# Patient Record
Sex: FEMALE | Race: BLACK OR AFRICAN AMERICAN | NOT HISPANIC OR LATINO | Employment: FULL TIME | URBAN - METROPOLITAN AREA
[De-identification: names, ages, dates, MRNs, and addresses within clinical notes are randomized per-mention and may not be internally consistent; named-entity substitution may affect disease eponyms.]

---

## 2020-10-19 DIAGNOSIS — Z76.89 ENCOUNTER TO ESTABLISH CARE WITH NEW DOCTOR: Primary | ICD-10-CM

## 2020-10-23 ENCOUNTER — TELEPHONE (OUTPATIENT)
Dept: OBSTETRICS AND GYNECOLOGY | Facility: CLINIC | Age: 55
End: 2020-10-23

## 2020-10-23 NOTE — TELEPHONE ENCOUNTER
----- Message from Tahira Sky sent at 10/23/2020  2:06 PM CDT -----  Regarding: pt  Type:  Patient Returning Call    Who Called:pt  Who Left Message for Patient:Shannan  Does the patient know what this is regarding?:  Would the patient rather a call back or a response via [facility]? Call back  Best Call Back Number:[phone]  Additional Information:

## 2020-11-18 ENCOUNTER — OFFICE VISIT (OUTPATIENT)
Dept: OBSTETRICS AND GYNECOLOGY | Facility: CLINIC | Age: 55
End: 2020-11-18
Payer: MEDICAID

## 2020-11-18 VITALS
SYSTOLIC BLOOD PRESSURE: 144 MMHG | WEIGHT: 235.19 LBS | BODY MASS INDEX: 32.93 KG/M2 | DIASTOLIC BLOOD PRESSURE: 81 MMHG | HEART RATE: 59 BPM | HEIGHT: 71 IN

## 2020-11-18 DIAGNOSIS — N93.8 DUB (DYSFUNCTIONAL UTERINE BLEEDING): ICD-10-CM

## 2020-11-18 DIAGNOSIS — N93.9 ABNORMAL VAGINAL BLEEDING: Primary | ICD-10-CM

## 2020-11-18 LAB
ABS NRBC COUNT: 0 X 10 3/UL (ref 0–0.01)
ABSOLUTE BASOPHIL: 0.02 X 10 3/UL (ref 0–0.22)
ABSOLUTE EOSINOPHIL: 0.23 X 10 3/UL (ref 0.04–0.54)
ABSOLUTE IMMATURE GRAN: 0.02 X 10 3/UL (ref 0–0.04)
ABSOLUTE LYMPHOCYTE: 2.31 X 10 3/UL (ref 0.86–4.75)
ABSOLUTE MONOCYTE: 0.61 X 10 3/UL (ref 0.22–1.08)
BASOPHILS NFR BLD: 0.3 % (ref 0.2–1.2)
EOSINOPHIL NFR BLD: 3.5 % (ref 0.7–7)
FSH: 26 MIU/ML
HCT VFR BLD AUTO: 31.1 % (ref 37–47)
HGB BLD-MCNC: 9 G/DL (ref 12–16)
IMMATURE GRANULOCYTES: 0.3 % (ref 0–0.5)
LH: 18 MIU/ML
LYMPHOCYTES NFR BLD: 34.8 % (ref 19.3–53.1)
MCH RBC QN AUTO: 21.4 PG (ref 27–32)
MCHC RBC AUTO-ENTMCNC: 28.9 G/DL (ref 32–36)
MCV RBC AUTO: 74 FL (ref 82–100)
MONOCYTES NFR BLD: 9.2 % (ref 4.7–12.5)
NEUTROPHILS # BLD AUTO: 3.44 X 10 3/UL (ref 2.15–7.56)
NEUTROPHILS NFR BLD: 51.9 %
NUCLEATED RED BLOOD CELLS: 0 /100 WBC (ref 0–0.2)
PLATELET # BLD AUTO: ABNORMAL X 10 3/UL (ref 135–400)
RBC # BLD AUTO: 4.2 X 10 6/UL (ref 4.2–5.4)
RDW-SD: 51.9 FL (ref 37–54)
WBC # BLD: 6.63 X 10 3/UL (ref 4.3–10.8)

## 2020-11-18 PROCEDURE — 99203 PR OFFICE/OUTPT VISIT, NEW, LEVL III, 30-44 MIN: ICD-10-PCS | Mod: S$GLB,,, | Performed by: OBSTETRICS & GYNECOLOGY

## 2020-11-18 PROCEDURE — 99203 OFFICE O/P NEW LOW 30 MIN: CPT | Mod: S$GLB,,, | Performed by: OBSTETRICS & GYNECOLOGY

## 2020-11-18 RX ORDER — IBUPROFEN 800 MG/1
TABLET ORAL
COMMUNITY
Start: 2020-10-01 | End: 2021-04-30 | Stop reason: ALTCHOICE

## 2020-11-18 NOTE — PROGRESS NOTES
Subjective:       Patient ID: Mayra Bellamy is a 55 y.o. female.    Chief Complaint: Vaginal Bleeding    Is a 55-year-old female here for heavy bleeding she had no periods for about 4 months within the past year than last several months is had little bit of bleeding often on this started very heavy bleeding last 3 days.  She was take sounds in stop her bleeding but does not wanting an white therefore a total body bone give her Depo-Provera.  Will give her Provera 10 mg 1 every 6 hr for 3 doses then 1 a day will get FSH S CBC will schedule sonogram week she has had at fibroids and will go from there    Review of Systems   Constitutional: Negative for activity change, appetite change, chills, fever and unexpected weight change.   HENT: Negative for nasal congestion, dental problem, facial swelling, hearing loss and mouth sores.    Respiratory: Negative for apnea, chest tightness, shortness of breath and wheezing.    Cardiovascular: Negative for chest pain and leg swelling.   Gastrointestinal: Negative for abdominal distention, abdominal pain, anal bleeding, blood in stool, constipation, diarrhea, nausea, rectal pain and vomiting.   Genitourinary: Negative for decreased urine volume, difficulty urinating, dyspareunia, dysuria, enuresis, flank pain, frequency, genital sores, hematuria, menstrual problem, pelvic pain, urgency, vaginal bleeding, vaginal discharge and vaginal pain.   Musculoskeletal: Negative for arthralgias, back pain, joint swelling, myalgias and neck pain.   Integumentary:  Negative for color change, pallor, rash and wound.   Allergic/Immunologic: Negative for immunocompromised state.   Neurological: Negative for dizziness, light-headedness and headaches.   Hematological: Negative for adenopathy. Does not bruise/bleed easily.   Psychiatric/Behavioral: Negative for agitation, behavioral problems, confusion, sleep disturbance and suicidal ideas. The patient is not nervous/anxious and is not hyperactive.           Objective:      Physical Exam  Constitutional:       Appearance: She is well-developed.   HENT:      Head: Normocephalic.      Nose: Nose normal.   Eyes:      Conjunctiva/sclera: Conjunctivae normal.      Pupils: Pupils are equal, round, and reactive to light.   Neck:      Musculoskeletal: Normal range of motion and neck supple.   Cardiovascular:      Rate and Rhythm: Normal rate and regular rhythm.      Heart sounds: Normal heart sounds.   Pulmonary:      Effort: Pulmonary effort is normal.      Breath sounds: Normal breath sounds.   Abdominal:      General: Bowel sounds are normal.      Palpations: Abdomen is soft.   Genitourinary:     Vagina: Normal.      Comments: Uterus is a high feel enlargement her fibroids no adnexal masses is a mild moderate amount of old blood in the vaginal vault  Musculoskeletal: Normal range of motion.   Skin:     General: Skin is warm and dry.   Neurological:      Mental Status: She is alert and oriented to person, place, and time.   Psychiatric:         Behavior: Behavior normal.         Thought Content: Thought content normal.         Assessment:       1. Abnormal vaginal bleeding    2. DUB (dysfunctional uterine bleeding)        Plan:         Provera 10 mg 1 every 6 hr 3 doses then 1 day

## 2020-11-18 NOTE — LETTER
November 18, 2020      Sukhwinder Haney MD  [facility]  [address]  [facility] [address]           [facility]  [address]  [place] [address]  Phone: [phone]  Fax: [phone]          Patient: Mayra Bellamy   MR Number: [id number]   YOB: 1965   Date of Visit: 11/18/2020       Dear Dr. Sukhwinder Haney:    Thank you for referring Mayra Bellamy to me for evaluation. Attached you will find relevant portions of my assessment and plan of care.    If you have questions, please do not hesitate to call me. I look forward to following Mayra Bellamy along with you.    Sincerely,    Demetrius Cortez MD    Enclosure  CC:  No Recipients    If you would like to receive this communication electronically, please contact [email or web address] or [phone] to request more information on [facility] access.    For providers and/or their staff who would like to refer a patient to [facility], please contact us through our one-stop-shop provider referral line, [facility], at [phone].    If you feel you have received this communication in error or would no longer like to receive these types of communications, please e-mail [email or web address]

## 2020-11-19 ENCOUNTER — TELEPHONE (OUTPATIENT)
Dept: OBSTETRICS AND GYNECOLOGY | Facility: CLINIC | Age: 55
End: 2020-11-19

## 2020-11-19 NOTE — TELEPHONE ENCOUNTER
----- Message from Deyanira Gerardo sent at 11/19/2020  2:01 PM CST -----  Please call pt @ [phone] regarding medication for bleeding, pt states call several times, pt states it urgent, states pharmacy do not, pt need to know status.

## 2020-11-25 ENCOUNTER — TELEPHONE (OUTPATIENT)
Dept: OBSTETRICS AND GYNECOLOGY | Facility: CLINIC | Age: 55
End: 2020-11-25

## 2020-11-25 ENCOUNTER — PROCEDURE VISIT (OUTPATIENT)
Dept: OBSTETRICS AND GYNECOLOGY | Facility: CLINIC | Age: 55
End: 2020-11-25
Payer: MEDICAID

## 2020-11-25 ENCOUNTER — OFFICE VISIT (OUTPATIENT)
Dept: OBSTETRICS AND GYNECOLOGY | Facility: CLINIC | Age: 55
End: 2020-11-25
Payer: MEDICAID

## 2020-11-25 DIAGNOSIS — N93.8 DUB (DYSFUNCTIONAL UTERINE BLEEDING): Primary | ICD-10-CM

## 2020-11-25 DIAGNOSIS — N93.8 DUB (DYSFUNCTIONAL UTERINE BLEEDING): ICD-10-CM

## 2020-11-25 PROCEDURE — 99213 PR OFFICE/OUTPT VISIT, EST, LEVL III, 20-29 MIN: ICD-10-PCS | Mod: 25,S$GLB,, | Performed by: OBSTETRICS & GYNECOLOGY

## 2020-11-25 PROCEDURE — 76830 TRANSVAGINAL US NON-OB: CPT | Mod: 26,S$GLB,, | Performed by: OBSTETRICS & GYNECOLOGY

## 2020-11-25 PROCEDURE — 58100 BIOPSY OF UTERUS LINING: CPT | Mod: S$GLB,,, | Performed by: OBSTETRICS & GYNECOLOGY

## 2020-11-25 PROCEDURE — 58100 PR BIOPSY OF UTERUS LINING: ICD-10-PCS | Mod: S$GLB,,, | Performed by: OBSTETRICS & GYNECOLOGY

## 2020-11-25 PROCEDURE — 76830 PR  ECHOGRAPHY,TRANSVAGINAL: ICD-10-PCS | Mod: 26,S$GLB,, | Performed by: OBSTETRICS & GYNECOLOGY

## 2020-11-25 PROCEDURE — 99213 OFFICE O/P EST LOW 20 MIN: CPT | Mod: 25,S$GLB,, | Performed by: OBSTETRICS & GYNECOLOGY

## 2020-11-25 NOTE — PROGRESS NOTES
This patient is a 55-year-old female had a very heavy bleed ultrasound shows 2 small fibroids 27 x 24 and 29 x 27 endometrium is 11.5 right after this having bleed see viewpoint for complete dictation endometrial biopsy was done today Betadine prep of the cervix was done uterus is very high as she has had a section before and a biopsy was done with old black blood retrieved she has had a little bit of cramping did well no vasovagal insert was removed from the vagina she is him some was sent home the same difficulty will await the biopsy results and will decide what to do,, see viewpoint for complete dictation on ultrasound

## 2020-12-01 RX ORDER — NORETHINDRONE ACETATE AND ETHINYL ESTRADIOL 1; 5 MG/1; UG/1
1 TABLET ORAL DAILY
Qty: 30 TABLET | Refills: 11 | Status: SHIPPED | OUTPATIENT
Start: 2020-12-01 | End: 2022-01-19

## 2020-12-23 NOTE — PROGRESS NOTES
Patient is here for dysfunctional bleeding ultrasound shows a uterine fibroid and left ovarian cyst see ultrasound report for complete details

## 2021-01-19 DIAGNOSIS — N93.9 ABNORMAL VAGINAL BLEEDING: Primary | ICD-10-CM

## 2021-01-19 RX ORDER — FERROUS SULFATE 325(65) MG
TABLET ORAL
Qty: 30 TABLET | Refills: 0 | Status: SHIPPED | OUTPATIENT
Start: 2021-01-19 | End: 2021-03-22 | Stop reason: SDUPTHER

## 2021-02-24 ENCOUNTER — TELEPHONE (OUTPATIENT)
Dept: OBSTETRICS AND GYNECOLOGY | Facility: CLINIC | Age: 56
End: 2021-02-24

## 2021-03-22 ENCOUNTER — TELEPHONE (OUTPATIENT)
Dept: PRIMARY CARE CLINIC | Facility: CLINIC | Age: 56
End: 2021-03-22

## 2021-03-22 ENCOUNTER — OFFICE VISIT (OUTPATIENT)
Dept: PRIMARY CARE CLINIC | Facility: CLINIC | Age: 56
End: 2021-03-22
Payer: MEDICAID

## 2021-03-22 VITALS
BODY MASS INDEX: 34.25 KG/M2 | HEART RATE: 85 BPM | SYSTOLIC BLOOD PRESSURE: 129 MMHG | HEIGHT: 71 IN | WEIGHT: 244.63 LBS | TEMPERATURE: 98 F | DIASTOLIC BLOOD PRESSURE: 86 MMHG | OXYGEN SATURATION: 98 %

## 2021-03-22 DIAGNOSIS — K42.9 UMBILICAL HERNIA WITHOUT OBSTRUCTION AND WITHOUT GANGRENE: ICD-10-CM

## 2021-03-22 DIAGNOSIS — N93.9 ABNORMAL VAGINAL BLEEDING: ICD-10-CM

## 2021-03-22 DIAGNOSIS — F17.200 SMOKER: ICD-10-CM

## 2021-03-22 DIAGNOSIS — Z98.890 H/O ARTHROSCOPY OF LEFT KNEE: ICD-10-CM

## 2021-03-22 DIAGNOSIS — D50.9 IRON DEFICIENCY ANEMIA, UNSPECIFIED IRON DEFICIENCY ANEMIA TYPE: Primary | ICD-10-CM

## 2021-03-22 PROCEDURE — 99204 OFFICE O/P NEW MOD 45 MIN: CPT | Mod: S$GLB,,, | Performed by: INTERNAL MEDICINE

## 2021-03-22 PROCEDURE — 99204 PR OFFICE/OUTPT VISIT, NEW, LEVL IV, 45-59 MIN: ICD-10-PCS | Mod: S$GLB,,, | Performed by: INTERNAL MEDICINE

## 2021-03-22 RX ORDER — VARENICLINE TARTRATE 0.5 (11)-1
KIT ORAL
Qty: 1 PACKAGE | Refills: 0 | Status: SHIPPED | OUTPATIENT
Start: 2021-03-22 | End: 2022-01-19

## 2021-03-22 RX ORDER — MONTELUKAST SODIUM 10 MG/1
10 TABLET ORAL NIGHTLY
COMMUNITY
End: 2021-09-22 | Stop reason: SDUPTHER

## 2021-03-22 RX ORDER — ALBUTEROL SULFATE 90 UG/1
AEROSOL, METERED RESPIRATORY (INHALATION)
COMMUNITY
Start: 2020-12-18 | End: 2022-03-09 | Stop reason: SDUPTHER

## 2021-03-22 RX ORDER — MEDROXYPROGESTERONE ACETATE 5 MG/1
5 TABLET ORAL DAILY
COMMUNITY
Start: 2020-12-28 | End: 2023-05-04 | Stop reason: ALTCHOICE

## 2021-03-22 RX ORDER — FERROUS SULFATE 325(65) MG
325 TABLET ORAL DAILY
Qty: 90 TABLET | Refills: 3 | Status: SHIPPED | OUTPATIENT
Start: 2021-03-22 | End: 2021-04-08 | Stop reason: SDUPTHER

## 2021-03-23 ENCOUNTER — TELEPHONE (OUTPATIENT)
Dept: PRIMARY CARE CLINIC | Facility: CLINIC | Age: 56
End: 2021-03-23

## 2021-03-23 LAB
FERRITIN SERPL-MCNC: 39 NG/ML (ref 16–232)
IRON SATN MFR SERPL: 9 % (CALC) (ref 16–45)
IRON SERPL-MCNC: 36 MCG/DL (ref 45–160)
T4 FREE SERPL-MCNC: 1.1 NG/DL (ref 0.8–1.8)
TIBC SERPL-MCNC: 402 MCG/DL (CALC) (ref 250–450)
TSH SERPL-ACNC: 0.63 MIU/L

## 2021-03-30 ENCOUNTER — OFFICE VISIT (OUTPATIENT)
Dept: SURGERY | Facility: CLINIC | Age: 56
End: 2021-03-30
Payer: MEDICAID

## 2021-03-30 VITALS
DIASTOLIC BLOOD PRESSURE: 86 MMHG | BODY MASS INDEX: 33.46 KG/M2 | WEIGHT: 239 LBS | HEIGHT: 71 IN | SYSTOLIC BLOOD PRESSURE: 150 MMHG | HEART RATE: 87 BPM | OXYGEN SATURATION: 99 % | RESPIRATION RATE: 17 BRPM

## 2021-03-30 DIAGNOSIS — K43.6 INCARCERATED VENTRAL HERNIA: ICD-10-CM

## 2021-03-30 PROCEDURE — 99204 PR OFFICE/OUTPT VISIT, NEW, LEVL IV, 45-59 MIN: ICD-10-PCS | Mod: S$GLB,,, | Performed by: SURGERY

## 2021-03-30 PROCEDURE — 99204 OFFICE O/P NEW MOD 45 MIN: CPT | Mod: S$GLB,,, | Performed by: SURGERY

## 2021-04-08 DIAGNOSIS — N93.9 ABNORMAL VAGINAL BLEEDING: ICD-10-CM

## 2021-04-08 RX ORDER — FERROUS SULFATE 325(65) MG
325 TABLET ORAL DAILY
Qty: 90 TABLET | Refills: 3 | Status: SHIPPED | OUTPATIENT
Start: 2021-04-08 | End: 2021-11-11 | Stop reason: SDUPTHER

## 2021-04-14 LAB
ABS NRBC COUNT: 0 THOU/UL (ref 0–0.01)
ABSOLUTE BASOPHIL: 0 10*3/UL (ref 0–0.3)
ABSOLUTE EOSINOPHIL: 0.2 10*3/UL (ref 0–0.6)
ABSOLUTE IMMATURE GRAN: 0.02 THOU/UL (ref 0–0.03)
ABSOLUTE LYMPHOCYTE: 2.1 10*3/UL (ref 1.2–4)
ABSOLUTE MONOCYTE: 0.9 10*3/UL (ref 0.1–0.8)
ANION GAP SERPL CALC-SCNC: 6 MMOL/L (ref 3–11)
APTT PPP: 37.7 SEC (ref 25.1–36.5)
BASOPHILS NFR BLD: 0.1 % (ref 0–3)
BUN SERPL-MCNC: 9 MG/DL (ref 7–18)
CALCIUM SERPL-MCNC: 9.1 MG/DL (ref 8.5–10.1)
CHLORIDE SERPL-SCNC: 109 MMOL/L (ref 98–107)
CO2 SERPL-SCNC: 26 MMOL/L (ref 21–32)
CREAT SERPL-MCNC: 0.79 MG/DL (ref 0.55–1.02)
EOSINOPHIL NFR BLD: 2.3 % (ref 0–6)
ERYTHROCYTE [DISTWIDTH] IN BLOOD BY AUTOMATED COUNT: 16.2 % (ref 0–15.5)
GFR ESTIMATION: > 60
GLUCOSE SERPL-MCNC: 76 MG/DL (ref 74–106)
HCT VFR BLD AUTO: 40.6 % (ref 37–47)
HGB BLD-MCNC: 12.8 G/DL (ref 12–16)
IMMATURE GRANULOCYTES: 0.3 % (ref 0–0.43)
INR PPP: 1.2 INR (ref 0.9–1.1)
LYMPHOCYTES NFR BLD: 26.2 % (ref 20–45)
MCH RBC QN AUTO: 29.3 PG (ref 27–32)
MCHC RBC AUTO-ENTMCNC: 31.5 % (ref 32–36)
MCV RBC AUTO: 92.9 FL (ref 80–99)
MONOCYTES NFR BLD: 11.7 % (ref 2–10)
NEUTROPHILS # BLD AUTO: 4.8 10*3/UL (ref 1.4–7)
NEUTROPHILS NFR BLD: 59.4 % (ref 50–80)
NUCLEATED RED BLOOD CELLS: 0 % (ref 0–0.2)
PLATELETS: 305 10*3/UL (ref 130–400)
PMV BLD AUTO: 10.3 FL (ref 9.2–12.2)
POTASSIUM SERPL-SCNC: 4.4 MMOL/L (ref 3.5–5.1)
PROTHROMBIN TIME: 13.6 SEC (ref 10.2–12.9)
RBC # BLD AUTO: 4.37 10*6/UL (ref 4.2–5.4)
SODIUM BLD-SCNC: 141 MMOL/L (ref 131–143)
WBC # BLD: 8 10*3/UL (ref 4.5–10)

## 2021-04-21 ENCOUNTER — OUTSIDE PLACE OF SERVICE (OUTPATIENT)
Dept: SURGERY | Facility: CLINIC | Age: 56
End: 2021-04-21
Payer: MEDICAID

## 2021-04-21 ENCOUNTER — OUTSIDE PLACE OF SERVICE (OUTPATIENT)
Dept: SURGERY | Facility: CLINIC | Age: 56
End: 2021-04-21

## 2021-04-21 PROCEDURE — 49653 PR LAP VENTRAL/UMBILICAL HERNIA; INCARCERATED OR STRANGULATED: CPT | Mod: AS,,, | Performed by: REGISTERED NURSE

## 2021-04-21 PROCEDURE — 49653 PR LAP VENTRAL/UMBILICAL HERNIA; INCARCERATED OR STRANGULATED: ICD-10-PCS | Mod: AS,,, | Performed by: REGISTERED NURSE

## 2021-04-21 PROCEDURE — 49653 PR LAP VENTRAL/UMBILICAL HERNIA; INCARCERATED OR STRANGULATED: CPT | Mod: ,,, | Performed by: SURGERY

## 2021-04-21 PROCEDURE — 49653 PR LAP VENTRAL/UMBILICAL HERNIA; INCARCERATED OR STRANGULATED: ICD-10-PCS | Mod: ,,, | Performed by: SURGERY

## 2021-04-30 ENCOUNTER — TELEPHONE (OUTPATIENT)
Dept: SURGERY | Facility: CLINIC | Age: 56
End: 2021-04-30

## 2021-04-30 RX ORDER — IBUPROFEN 800 MG/1
800 TABLET ORAL 3 TIMES DAILY
Qty: 30 TABLET | Refills: 0 | Status: SHIPPED | OUTPATIENT
Start: 2021-04-30 | End: 2022-03-09 | Stop reason: SDUPTHER

## 2021-05-03 ENCOUNTER — OFFICE VISIT (OUTPATIENT)
Dept: SURGERY | Facility: CLINIC | Age: 56
End: 2021-05-03
Payer: MEDICAID

## 2021-05-03 VITALS
WEIGHT: 245 LBS | OXYGEN SATURATION: 95 % | SYSTOLIC BLOOD PRESSURE: 131 MMHG | DIASTOLIC BLOOD PRESSURE: 85 MMHG | BODY MASS INDEX: 34.3 KG/M2 | RESPIRATION RATE: 17 BRPM | HEART RATE: 74 BPM | HEIGHT: 71 IN

## 2021-05-03 DIAGNOSIS — Z98.890 STATUS POST SURGERY: Primary | ICD-10-CM

## 2021-05-03 PROCEDURE — 99024 POSTOP FOLLOW-UP VISIT: CPT | Mod: S$GLB,,, | Performed by: REGISTERED NURSE

## 2021-05-03 PROCEDURE — 99024 PR POST-OP FOLLOW-UP VISIT: ICD-10-PCS | Mod: S$GLB,,, | Performed by: REGISTERED NURSE

## 2021-05-03 RX ORDER — FERROUS SULFATE 325(65) MG
325 TABLET ORAL 2 TIMES DAILY
COMMUNITY
Start: 2021-04-10 | End: 2021-09-13 | Stop reason: SDUPTHER

## 2021-05-03 RX ORDER — TRAMADOL HYDROCHLORIDE 50 MG/1
50 TABLET ORAL EVERY 4 HOURS
COMMUNITY
Start: 2021-04-22 | End: 2023-05-04 | Stop reason: ALTCHOICE

## 2021-05-11 ENCOUNTER — TELEPHONE (OUTPATIENT)
Dept: ORTHOPEDICS | Facility: CLINIC | Age: 56
End: 2021-05-11

## 2021-05-11 ENCOUNTER — TELEPHONE (OUTPATIENT)
Dept: PRIMARY CARE CLINIC | Facility: CLINIC | Age: 56
End: 2021-05-11

## 2021-05-12 ENCOUNTER — TELEPHONE (OUTPATIENT)
Dept: ORTHOPEDICS | Facility: CLINIC | Age: 56
End: 2021-05-12

## 2021-05-13 ENCOUNTER — TELEPHONE (OUTPATIENT)
Dept: PRIMARY CARE CLINIC | Facility: CLINIC | Age: 56
End: 2021-05-13

## 2021-05-20 ENCOUNTER — OFFICE VISIT (OUTPATIENT)
Dept: PRIMARY CARE CLINIC | Facility: CLINIC | Age: 56
End: 2021-05-20
Payer: MEDICAID

## 2021-05-20 VITALS
DIASTOLIC BLOOD PRESSURE: 84 MMHG | BODY MASS INDEX: 34.91 KG/M2 | OXYGEN SATURATION: 95 % | HEIGHT: 71 IN | WEIGHT: 249.38 LBS | SYSTOLIC BLOOD PRESSURE: 134 MMHG | TEMPERATURE: 98 F | HEART RATE: 75 BPM

## 2021-05-20 DIAGNOSIS — G89.29 CHRONIC PAIN OF BOTH KNEES: Primary | ICD-10-CM

## 2021-05-20 DIAGNOSIS — M25.562 CHRONIC PAIN OF BOTH KNEES: Primary | ICD-10-CM

## 2021-05-20 DIAGNOSIS — M25.561 CHRONIC PAIN OF BOTH KNEES: Primary | ICD-10-CM

## 2021-05-20 PROCEDURE — 99214 OFFICE O/P EST MOD 30 MIN: CPT | Mod: S$GLB,,, | Performed by: INTERNAL MEDICINE

## 2021-05-20 PROCEDURE — 99214 PR OFFICE/OUTPT VISIT, EST, LEVL IV, 30-39 MIN: ICD-10-PCS | Mod: S$GLB,,, | Performed by: INTERNAL MEDICINE

## 2021-06-03 ENCOUNTER — TELEPHONE (OUTPATIENT)
Dept: SURGERY | Facility: CLINIC | Age: 56
End: 2021-06-03

## 2021-06-04 ENCOUNTER — TELEPHONE (OUTPATIENT)
Dept: PRIMARY CARE CLINIC | Facility: CLINIC | Age: 56
End: 2021-06-04

## 2021-06-15 ENCOUNTER — OFFICE VISIT (OUTPATIENT)
Dept: ORTHOPEDICS | Facility: CLINIC | Age: 56
End: 2021-06-15
Payer: MEDICAID

## 2021-06-15 VITALS — HEIGHT: 69 IN | BODY MASS INDEX: 37.5 KG/M2 | TEMPERATURE: 100 F | WEIGHT: 253.19 LBS

## 2021-06-15 DIAGNOSIS — M17.0 PRIMARY OSTEOARTHRITIS OF BOTH KNEES: ICD-10-CM

## 2021-06-15 PROCEDURE — 99203 PR OFFICE/OUTPT VISIT, NEW, LEVL III, 30-44 MIN: ICD-10-PCS | Mod: S$GLB,,, | Performed by: ORTHOPAEDIC SURGERY

## 2021-06-15 PROCEDURE — 99203 OFFICE O/P NEW LOW 30 MIN: CPT | Mod: S$GLB,,, | Performed by: ORTHOPAEDIC SURGERY

## 2021-06-29 ENCOUNTER — OFFICE VISIT (OUTPATIENT)
Dept: ORTHOPEDICS | Facility: CLINIC | Age: 56
End: 2021-06-29
Payer: MEDICAID

## 2021-06-29 DIAGNOSIS — M17.0 PRIMARY OSTEOARTHRITIS OF BOTH KNEES: Primary | ICD-10-CM

## 2021-06-29 PROCEDURE — 1160F RVW MEDS BY RX/DR IN RCRD: CPT | Mod: CPTII,S$GLB,, | Performed by: ORTHOPAEDIC SURGERY

## 2021-06-29 PROCEDURE — 1159F PR MEDICATION LIST DOCUMENTED IN MEDICAL RECORD: ICD-10-PCS | Mod: CPTII,S$GLB,, | Performed by: ORTHOPAEDIC SURGERY

## 2021-06-29 PROCEDURE — 1159F MED LIST DOCD IN RCRD: CPT | Mod: CPTII,S$GLB,, | Performed by: ORTHOPAEDIC SURGERY

## 2021-06-29 PROCEDURE — 99212 OFFICE O/P EST SF 10 MIN: CPT | Mod: S$GLB,,, | Performed by: ORTHOPAEDIC SURGERY

## 2021-06-29 PROCEDURE — 1160F PR REVIEW ALL MEDS BY PRESCRIBER/CLIN PHARMACIST DOCUMENTED: ICD-10-PCS | Mod: CPTII,S$GLB,, | Performed by: ORTHOPAEDIC SURGERY

## 2021-06-29 PROCEDURE — 99212 PR OFFICE/OUTPT VISIT, EST, LEVL II, 10-19 MIN: ICD-10-PCS | Mod: S$GLB,,, | Performed by: ORTHOPAEDIC SURGERY

## 2021-06-29 RX ORDER — MELOXICAM 15 MG/1
15 TABLET ORAL DAILY
COMMUNITY
Start: 2021-06-15 | End: 2023-05-04

## 2021-06-29 NOTE — LETTER
July 13, 2021      Maryjo Bolton MD  [address]  [place] [address]           [facility]  [address]  [place] [address]  Phone: [phone]  Fax: [phone]          Patient: Mayra Bellamy   MR Number: [id number]   YOB: 1965   Date of Visit: 6/29/2021       Dear Dr. Maryjo Bolton:    Thank you for referring Mayra Bellamy to me for evaluation. Attached you will find relevant portions of my assessment and plan of care.    If you have questions, please do not hesitate to call me. I look forward to following Mayra Bellamy along with you.    Sincerely,    Timothy Andino MD    Enclosure  CC:  No Recipients    If you would like to receive this communication electronically, please contact [email or web address] or [phone] to request more information on [facility] access.    For providers and/or their staff who would like to refer a patient to [facility], please contact us through our one-stop-shop provider referral line, [facility] , at [phone].    If you feel you have received this communication in error or would no longer like to receive these types of communications, please e-mail [email or web address]

## 2021-07-01 ENCOUNTER — TELEPHONE (OUTPATIENT)
Dept: PRIMARY CARE CLINIC | Facility: CLINIC | Age: 56
End: 2021-07-01

## 2021-07-12 ENCOUNTER — OFFICE VISIT (OUTPATIENT)
Dept: SURGERY | Facility: CLINIC | Age: 56
End: 2021-07-12
Payer: MEDICAID

## 2021-07-12 VITALS — WEIGHT: 251 LBS | BODY MASS INDEX: 37.18 KG/M2 | HEIGHT: 69 IN | RESPIRATION RATE: 18 BRPM

## 2021-07-12 DIAGNOSIS — K43.6 INCARCERATED VENTRAL HERNIA: Primary | ICD-10-CM

## 2021-07-12 PROCEDURE — 99024 POSTOP FOLLOW-UP VISIT: CPT | Mod: S$GLB,,, | Performed by: SURGERY

## 2021-07-12 PROCEDURE — 99024 PR POST-OP FOLLOW-UP VISIT: ICD-10-PCS | Mod: S$GLB,,, | Performed by: SURGERY

## 2021-08-26 ENCOUNTER — OFFICE VISIT (OUTPATIENT)
Dept: ORTHOPEDICS | Facility: CLINIC | Age: 56
End: 2021-08-26
Payer: MEDICAID

## 2021-08-26 DIAGNOSIS — M17.0 PRIMARY OSTEOARTHRITIS OF BOTH KNEES: Primary | ICD-10-CM

## 2021-08-26 PROCEDURE — 20610 LARGE JOINT ASPIRATION/INJECTION: BILATERAL KNEE: ICD-10-PCS | Mod: 50,S$GLB,, | Performed by: ORTHOPAEDIC SURGERY

## 2021-08-26 PROCEDURE — 99213 OFFICE O/P EST LOW 20 MIN: CPT | Mod: 25,S$GLB,, | Performed by: ORTHOPAEDIC SURGERY

## 2021-08-26 PROCEDURE — 20610 DRAIN/INJ JOINT/BURSA W/O US: CPT | Mod: 50,S$GLB,, | Performed by: ORTHOPAEDIC SURGERY

## 2021-08-26 PROCEDURE — 99213 PR OFFICE/OUTPT VISIT, EST, LEVL III, 20-29 MIN: ICD-10-PCS | Mod: 25,S$GLB,, | Performed by: ORTHOPAEDIC SURGERY

## 2021-08-26 RX ORDER — TRIAMCINOLONE ACETONIDE 40 MG/ML
20 INJECTION, SUSPENSION INTRA-ARTICULAR; INTRAMUSCULAR
Status: DISCONTINUED | OUTPATIENT
Start: 2021-08-26 | End: 2021-08-26 | Stop reason: HOSPADM

## 2021-08-26 RX ADMIN — TRIAMCINOLONE ACETONIDE 20 MG: 40 INJECTION, SUSPENSION INTRA-ARTICULAR; INTRAMUSCULAR at 09:08

## 2021-09-13 DIAGNOSIS — N93.9 ABNORMAL VAGINAL BLEEDING: ICD-10-CM

## 2021-09-13 DIAGNOSIS — D50.9 IRON DEFICIENCY ANEMIA, UNSPECIFIED IRON DEFICIENCY ANEMIA TYPE: Primary | ICD-10-CM

## 2021-09-13 RX ORDER — FERROUS SULFATE 325(65) MG
325 TABLET ORAL 2 TIMES DAILY
Qty: 90 TABLET | Refills: 0 | Status: SHIPPED | OUTPATIENT
Start: 2021-09-13 | End: 2021-10-15 | Stop reason: SDUPTHER

## 2021-09-13 RX ORDER — FERROUS SULFATE 325(65) MG
325 TABLET ORAL DAILY
Qty: 90 TABLET | Refills: 3 | OUTPATIENT
Start: 2021-09-13

## 2021-09-22 ENCOUNTER — TELEPHONE (OUTPATIENT)
Dept: OBSTETRICS AND GYNECOLOGY | Facility: CLINIC | Age: 56
End: 2021-09-22

## 2021-09-22 RX ORDER — MONTELUKAST SODIUM 10 MG/1
10 TABLET ORAL NIGHTLY
Qty: 30 TABLET | Refills: 0 | Status: SHIPPED | OUTPATIENT
Start: 2021-09-22 | End: 2021-10-15 | Stop reason: SDUPTHER

## 2021-10-15 DIAGNOSIS — D50.9 IRON DEFICIENCY ANEMIA, UNSPECIFIED IRON DEFICIENCY ANEMIA TYPE: ICD-10-CM

## 2021-10-17 RX ORDER — FERROUS SULFATE 325(65) MG
325 TABLET ORAL 2 TIMES DAILY
Qty: 90 TABLET | Refills: 0 | Status: SHIPPED | OUTPATIENT
Start: 2021-10-17 | End: 2022-03-09 | Stop reason: SDUPTHER

## 2021-10-17 RX ORDER — MONTELUKAST SODIUM 10 MG/1
10 TABLET ORAL NIGHTLY
Qty: 30 TABLET | Refills: 0 | Status: SHIPPED | OUTPATIENT
Start: 2021-10-17 | End: 2021-11-22

## 2021-10-20 ENCOUNTER — TELEPHONE (OUTPATIENT)
Dept: FAMILY MEDICINE | Facility: CLINIC | Age: 56
End: 2021-10-20

## 2021-10-27 ENCOUNTER — TELEPHONE (OUTPATIENT)
Dept: PRIMARY CARE CLINIC | Facility: CLINIC | Age: 56
End: 2021-10-27
Payer: MEDICAID

## 2021-10-27 DIAGNOSIS — J32.9 SINUSITIS, UNSPECIFIED CHRONICITY, UNSPECIFIED LOCATION: Primary | ICD-10-CM

## 2021-10-28 DIAGNOSIS — D50.9 IRON DEFICIENCY ANEMIA, UNSPECIFIED IRON DEFICIENCY ANEMIA TYPE: ICD-10-CM

## 2021-10-28 RX ORDER — AZITHROMYCIN 250 MG/1
TABLET, FILM COATED ORAL
Qty: 6 TABLET | Refills: 0 | Status: SHIPPED | OUTPATIENT
Start: 2021-10-28 | End: 2021-11-02

## 2021-10-28 RX ORDER — FERROUS SULFATE 325(65) MG
325 TABLET ORAL 2 TIMES DAILY
Qty: 90 TABLET | Refills: 0 | OUTPATIENT
Start: 2021-10-28

## 2021-11-11 DIAGNOSIS — N93.9 ABNORMAL VAGINAL BLEEDING: ICD-10-CM

## 2021-11-11 RX ORDER — FERROUS SULFATE 325(65) MG
325 TABLET ORAL DAILY
Qty: 90 TABLET | Refills: 3 | Status: SHIPPED | OUTPATIENT
Start: 2021-11-11 | End: 2022-01-13 | Stop reason: SDUPTHER

## 2021-12-06 ENCOUNTER — OFFICE VISIT (OUTPATIENT)
Dept: SURGERY | Facility: CLINIC | Age: 56
End: 2021-12-06
Payer: MEDICAID

## 2021-12-06 VITALS — BODY MASS INDEX: 37.18 KG/M2 | RESPIRATION RATE: 18 BRPM | WEIGHT: 251 LBS | HEIGHT: 69 IN

## 2021-12-06 DIAGNOSIS — R10.13 EPIGASTRIC ABDOMINAL PAIN: Primary | ICD-10-CM

## 2021-12-06 PROCEDURE — 99213 OFFICE O/P EST LOW 20 MIN: CPT | Mod: S$GLB,,, | Performed by: SURGERY

## 2021-12-06 PROCEDURE — 99213 PR OFFICE/OUTPT VISIT, EST, LEVL III, 20-29 MIN: ICD-10-PCS | Mod: S$GLB,,, | Performed by: SURGERY

## 2021-12-06 RX ORDER — PANTOPRAZOLE SODIUM 40 MG/1
40 TABLET, DELAYED RELEASE ORAL DAILY
Qty: 30 TABLET | Refills: 0 | Status: SHIPPED | OUTPATIENT
Start: 2021-12-06 | End: 2023-05-04 | Stop reason: SDUPTHER

## 2021-12-27 ENCOUNTER — TELEPHONE (OUTPATIENT)
Dept: SURGERY | Facility: CLINIC | Age: 56
End: 2021-12-27
Payer: MEDICAID

## 2022-01-04 NOTE — PROGRESS NOTES
Subjective:      Patient ID: Mayra Bellamy is a 56 y.o. female.    Chief Complaint: Pain of the Right Knee and Pain of the Left Knee    HPI patient is here for follow-up for bilateral knee pain.  She is a little better on her meloxicam.    ROS unchanged from prior visit      Objective:      Active and passive range of motion of both knees is normal.  She has moderate varus alignment on standing.  She still is tender with palpation of the medial joint line and medial femoral condyle.    She has no pathologic laxity.  She has normal sensation and pulses.      Ortho/SPM Exam            Assessment:       Encounter Diagnosis   Name Primary?    Primary osteoarthritis of both knees Yes          Plan:       Mayra was seen today for pain and pain.    Diagnoses and all orders for this visit:    Primary osteoarthritis of both knees      She is to continue with her meloxicam.  Return 3 months for recheck

## 2022-01-13 DIAGNOSIS — N93.9 ABNORMAL VAGINAL BLEEDING: ICD-10-CM

## 2022-01-13 RX ORDER — FERROUS SULFATE 325(65) MG
325 TABLET ORAL DAILY
Qty: 90 TABLET | Refills: 3 | Status: SHIPPED | OUTPATIENT
Start: 2022-01-13 | End: 2022-06-23 | Stop reason: SDUPTHER

## 2022-01-13 NOTE — TELEPHONE ENCOUNTER
----- Message from Tahirazi Sky sent at 1/13/2022  8:17 AM CST -----  Regarding: pt  Type:  RX Refill Request    Who Called: pt  Refill or New Rx:refill  RX Name and Strength:ferrous sulfate (FEOSOL) 325 mg (65 mg iron) Tab tablet  How is the patient currently taking it? (ex. 1XDay):2xday  Is this a 30 day or 90 day RX:90  Preferred Pharmacy with phone number:  [facility][id number] - [place]  [facility] & [address] [address]  Phone: [phone] Fax: [phone]     Local or Mail Order:local  Ordering Provider:Hoang  Would the patient rather a call back or a response via [facility]? Call back  Best Call Back Number:[phone]  Additional Information:

## 2022-01-18 ENCOUNTER — TELEPHONE (OUTPATIENT)
Dept: OBSTETRICS AND GYNECOLOGY | Facility: CLINIC | Age: 57
End: 2022-01-18
Payer: MEDICAID

## 2022-01-18 NOTE — TELEPHONE ENCOUNTER
----- Message from Tahira Jose Daniel sent at 1/18/2022 11:35 AM CST -----  Regarding: pt  Type:  RX Refill Request    Who Called: pt  Refill or New Rx:refill  RX Name and Strength:norethindrone-ethinyl estradiol (FEMHRT 1/5) 1-5 mg-mcg Tab  How is the patient currently taking it? (ex. 1XDay):1xday  Is this a 30 day or 90 day RX:30  Preferred Pharmacy with phone number:  [facility] #[id number] - [address] [address]  Phone: [phone] Fax: [phone]     Local or Mail Order:local  Ordering Provider:Dr. Cortez  Would the patient rather a call back or a response via [facility]? Call back  Best Call Back Number:[phone]  Additional Information:

## 2022-01-19 ENCOUNTER — OFFICE VISIT (OUTPATIENT)
Dept: OBSTETRICS AND GYNECOLOGY | Facility: CLINIC | Age: 57
End: 2022-01-19
Payer: MEDICAID

## 2022-01-19 VITALS
DIASTOLIC BLOOD PRESSURE: 80 MMHG | HEART RATE: 79 BPM | WEIGHT: 256 LBS | SYSTOLIC BLOOD PRESSURE: 132 MMHG | BODY MASS INDEX: 37.8 KG/M2

## 2022-01-19 DIAGNOSIS — N95.0 POSTMENOPAUSAL BLEEDING: Primary | ICD-10-CM

## 2022-01-19 DIAGNOSIS — Z00.00 PHYSICAL EXAM, ANNUAL: ICD-10-CM

## 2022-01-19 DIAGNOSIS — Z12.4 SCREENING FOR MALIGNANT NEOPLASM OF THE CERVIX: ICD-10-CM

## 2022-01-19 PROCEDURE — 3079F DIAST BP 80-89 MM HG: CPT | Mod: CPTII,S$GLB,, | Performed by: OBSTETRICS & GYNECOLOGY

## 2022-01-19 PROCEDURE — 3075F SYST BP GE 130 - 139MM HG: CPT | Mod: CPTII,S$GLB,, | Performed by: OBSTETRICS & GYNECOLOGY

## 2022-01-19 PROCEDURE — 3008F BODY MASS INDEX DOCD: CPT | Mod: CPTII,S$GLB,, | Performed by: OBSTETRICS & GYNECOLOGY

## 2022-01-19 PROCEDURE — 3079F PR MOST RECENT DIASTOLIC BLOOD PRESSURE 80-89 MM HG: ICD-10-PCS | Mod: CPTII,S$GLB,, | Performed by: OBSTETRICS & GYNECOLOGY

## 2022-01-19 PROCEDURE — 3008F PR BODY MASS INDEX (BMI) DOCUMENTED: ICD-10-PCS | Mod: CPTII,S$GLB,, | Performed by: OBSTETRICS & GYNECOLOGY

## 2022-01-19 PROCEDURE — 99396 PR PREVENTIVE VISIT,EST,40-64: ICD-10-PCS | Mod: S$GLB,,, | Performed by: OBSTETRICS & GYNECOLOGY

## 2022-01-19 PROCEDURE — 3075F PR MOST RECENT SYSTOLIC BLOOD PRESS GE 130-139MM HG: ICD-10-PCS | Mod: CPTII,S$GLB,, | Performed by: OBSTETRICS & GYNECOLOGY

## 2022-01-19 PROCEDURE — 99396 PREV VISIT EST AGE 40-64: CPT | Mod: S$GLB,,, | Performed by: OBSTETRICS & GYNECOLOGY

## 2022-01-19 RX ORDER — CYCLOBENZAPRINE HCL 10 MG
10 TABLET ORAL 3 TIMES DAILY PRN
Qty: 30 TABLET | Refills: 1 | Status: SHIPPED | OUTPATIENT
Start: 2022-01-19 | End: 2022-01-29

## 2022-01-19 NOTE — PROGRESS NOTES
2Subjective:       Patient ID: Mayra Bellamy is a 56 y.o. female.    Chief Complaint: Well Woman    56-year-old female here for annual examination she has no complaints but does remain bleeding often on therefore.  Does have some low back pain often on and requests some mild pain medication    Review of Systems   Constitutional: Negative for activity change, appetite change, chills, fever and unexpected weight change.   HENT: Negative for nasal congestion, dental problem, facial swelling, hearing loss and mouth sores.    Respiratory: Negative for apnea, chest tightness, shortness of breath and wheezing.    Cardiovascular: Negative for chest pain and leg swelling.   Gastrointestinal: Negative for abdominal distention, abdominal pain, anal bleeding, blood in stool, constipation, diarrhea, nausea, rectal pain and vomiting.   Genitourinary: Negative for decreased urine volume, difficulty urinating, dyspareunia, dysuria, enuresis, flank pain, frequency, genital sores, hematuria, menstrual problem, pelvic pain, urgency, vaginal bleeding, vaginal discharge and vaginal pain.   Musculoskeletal: Negative for arthralgias, back pain, joint swelling, myalgias and neck pain.   Integumentary:  Negative for color change, pallor, rash and wound.   Allergic/Immunologic: Negative for immunocompromised state.   Neurological: Negative for dizziness, light-headedness and headaches.   Hematological: Negative for adenopathy. Does not bruise/bleed easily.   Psychiatric/Behavioral: Negative for agitation, behavioral problems, confusion, sleep disturbance and suicidal ideas. The patient is not nervous/anxious and is not hyperactive.          Objective:      Physical Exam  Constitutional:       Appearance: She is well-developed and well-nourished.   HENT:      Head: Normocephalic.      Nose: Nose normal.   Eyes:      Extraocular Movements: EOM normal.      Conjunctiva/sclera: Conjunctivae normal.      Pupils: Pupils are equal, round, and  reactive to light.   Cardiovascular:      Rate and Rhythm: Normal rate and regular rhythm.      Pulses: Intact distal pulses.      Heart sounds: Normal heart sounds.   Pulmonary:      Effort: Pulmonary effort is normal.      Breath sounds: Normal breath sounds.   Abdominal:      General: Bowel sounds are normal.      Palpations: Abdomen is soft.   Genitourinary:     Vagina: Normal.      Uterus: Normal.       Comments: Vulva vagina bimanual normal  Musculoskeletal:         General: Normal range of motion.      Cervical back: Normal range of motion and neck supple.   Skin:     General: Skin is warm and dry.   Neurological:      Mental Status: She is alert and oriented to person, place, and time.   Psychiatric:         Mood and Affect: Mood and affect normal.         Behavior: Behavior normal.         Thought Content: Thought content normal.       breast no this is  Assessment:       Problem List Items Addressed This Visit    None         Plan:

## 2022-01-24 DIAGNOSIS — N95.0 POSTMENOPAUSAL BLEEDING: Primary | ICD-10-CM

## 2022-01-24 LAB — HUMAN PAPILLOMAVIRUS (HPV): NORMAL

## 2022-01-26 ENCOUNTER — TELEPHONE (OUTPATIENT)
Dept: OBSTETRICS AND GYNECOLOGY | Facility: CLINIC | Age: 57
End: 2022-01-26

## 2022-01-27 ENCOUNTER — TELEPHONE (OUTPATIENT)
Dept: OBSTETRICS AND GYNECOLOGY | Facility: CLINIC | Age: 57
End: 2022-01-27
Payer: MEDICAID

## 2022-01-27 ENCOUNTER — PATIENT MESSAGE (OUTPATIENT)
Dept: ADMINISTRATIVE | Facility: HOSPITAL | Age: 57
End: 2022-01-27
Payer: MEDICAID

## 2022-01-28 DIAGNOSIS — N95.0 POSTMENOPAUSAL BLEEDING: Primary | ICD-10-CM

## 2022-03-09 ENCOUNTER — OFFICE VISIT (OUTPATIENT)
Dept: PRIMARY CARE CLINIC | Facility: CLINIC | Age: 57
End: 2022-03-09
Payer: MEDICAID

## 2022-03-09 VITALS
BODY MASS INDEX: 38.54 KG/M2 | HEIGHT: 69 IN | OXYGEN SATURATION: 100 % | HEART RATE: 74 BPM | WEIGHT: 260.19 LBS | SYSTOLIC BLOOD PRESSURE: 145 MMHG | DIASTOLIC BLOOD PRESSURE: 100 MMHG

## 2022-03-09 DIAGNOSIS — M25.562 CHRONIC PAIN OF BOTH KNEES: ICD-10-CM

## 2022-03-09 DIAGNOSIS — M25.561 CHRONIC PAIN OF BOTH KNEES: ICD-10-CM

## 2022-03-09 DIAGNOSIS — F17.200 SMOKER: ICD-10-CM

## 2022-03-09 DIAGNOSIS — E66.9 CLASS 2 OBESITY WITHOUT SERIOUS COMORBIDITY WITH BODY MASS INDEX (BMI) OF 38.0 TO 38.9 IN ADULT, UNSPECIFIED OBESITY TYPE: ICD-10-CM

## 2022-03-09 DIAGNOSIS — Z12.39 ENCOUNTER FOR SCREENING FOR MALIGNANT NEOPLASM OF BREAST, UNSPECIFIED SCREENING MODALITY: ICD-10-CM

## 2022-03-09 DIAGNOSIS — D50.9 IRON DEFICIENCY ANEMIA, UNSPECIFIED IRON DEFICIENCY ANEMIA TYPE: ICD-10-CM

## 2022-03-09 DIAGNOSIS — Z12.11 SCREENING FOR COLON CANCER: Primary | ICD-10-CM

## 2022-03-09 DIAGNOSIS — G89.29 CHRONIC PAIN OF BOTH KNEES: ICD-10-CM

## 2022-03-09 PROCEDURE — 3080F PR MOST RECENT DIASTOLIC BLOOD PRESSURE >= 90 MM HG: ICD-10-PCS | Mod: CPTII,S$GLB,, | Performed by: INTERNAL MEDICINE

## 2022-03-09 PROCEDURE — 99214 PR OFFICE/OUTPT VISIT, EST, LEVL IV, 30-39 MIN: ICD-10-PCS | Mod: S$GLB,,, | Performed by: INTERNAL MEDICINE

## 2022-03-09 PROCEDURE — 1159F PR MEDICATION LIST DOCUMENTED IN MEDICAL RECORD: ICD-10-PCS | Mod: CPTII,S$GLB,, | Performed by: INTERNAL MEDICINE

## 2022-03-09 PROCEDURE — 99214 OFFICE O/P EST MOD 30 MIN: CPT | Mod: S$GLB,,, | Performed by: INTERNAL MEDICINE

## 2022-03-09 PROCEDURE — 3080F DIAST BP >= 90 MM HG: CPT | Mod: CPTII,S$GLB,, | Performed by: INTERNAL MEDICINE

## 2022-03-09 PROCEDURE — 3077F SYST BP >= 140 MM HG: CPT | Mod: CPTII,S$GLB,, | Performed by: INTERNAL MEDICINE

## 2022-03-09 PROCEDURE — 3008F PR BODY MASS INDEX (BMI) DOCUMENTED: ICD-10-PCS | Mod: CPTII,S$GLB,, | Performed by: INTERNAL MEDICINE

## 2022-03-09 PROCEDURE — 1159F MED LIST DOCD IN RCRD: CPT | Mod: CPTII,S$GLB,, | Performed by: INTERNAL MEDICINE

## 2022-03-09 PROCEDURE — 3008F BODY MASS INDEX DOCD: CPT | Mod: CPTII,S$GLB,, | Performed by: INTERNAL MEDICINE

## 2022-03-09 PROCEDURE — 3077F PR MOST RECENT SYSTOLIC BLOOD PRESSURE >= 140 MM HG: ICD-10-PCS | Mod: CPTII,S$GLB,, | Performed by: INTERNAL MEDICINE

## 2022-03-09 RX ORDER — IBUPROFEN 200 MG
1 TABLET ORAL DAILY
Qty: 45 PATCH | Refills: 1 | Status: SHIPPED | OUTPATIENT
Start: 2022-03-09 | End: 2023-05-04 | Stop reason: ALTCHOICE

## 2022-03-09 RX ORDER — IBUPROFEN 800 MG/1
800 TABLET ORAL 3 TIMES DAILY
Qty: 30 TABLET | Refills: 0 | Status: SHIPPED | OUTPATIENT
Start: 2022-03-09 | End: 2023-05-04 | Stop reason: SDUPTHER

## 2022-03-09 RX ORDER — FERROUS SULFATE 325(65) MG
325 TABLET ORAL 2 TIMES DAILY
Qty: 90 TABLET | Refills: 0 | Status: SHIPPED | OUTPATIENT
Start: 2022-03-09 | End: 2022-04-13 | Stop reason: SDUPTHER

## 2022-03-09 RX ORDER — PHENTERMINE HYDROCHLORIDE 37.5 MG/1
37.5 TABLET ORAL
Qty: 30 TABLET | Refills: 2 | Status: SHIPPED | OUTPATIENT
Start: 2022-03-09 | End: 2022-04-08

## 2022-03-09 RX ORDER — MONTELUKAST SODIUM 10 MG/1
10 TABLET ORAL NIGHTLY
Qty: 90 TABLET | Refills: 3 | Status: SHIPPED | OUTPATIENT
Start: 2022-03-09 | End: 2023-03-21

## 2022-03-09 RX ORDER — ALBUTEROL SULFATE 90 UG/1
AEROSOL, METERED RESPIRATORY (INHALATION)
Qty: 18 G | Refills: 3 | Status: SHIPPED | OUTPATIENT
Start: 2022-03-09 | End: 2022-11-08

## 2022-03-09 NOTE — PROGRESS NOTES
Subjective:      Patient ID: Mayra Bellamy is a 56 y.o. female.    Chief Complaint: Follow-up (Pt is interested in the patch to help stop smoking. Pt states the pill had her thinking crazy and could not sleep) and Weight Loss (Pt is requesting alpex)    HPI     Here for above problems. She reports some vaginal bleeding and Dr Cortez ordered an US which she has not done yet,. I asked her why BP is high and she said she is eating a lot of salt and pulled out a bag of cheetos    Review of Systems   Constitutional: Negative for chills, fever and weight loss.   Respiratory: Positive for shortness of breath. Negative for cough and wheezing.         Exertional    Cardiovascular: Negative for chest pain, palpitations and leg swelling.   Gastrointestinal: Positive for heartburn. Negative for abdominal pain, constipation, diarrhea, nausea and vomiting.   Genitourinary: Negative for dysuria, frequency and urgency.   Musculoskeletal: Positive for joint pain. Negative for falls.        Knee pain   Neurological: Negative for dizziness and headaches.     Objective:     Physical Exam  Vitals reviewed.   Constitutional:       Appearance: Normal appearance. She is obese.   HENT:      Head: Normocephalic.   Eyes:      Extraocular Movements: Extraocular movements intact.      Conjunctiva/sclera: Conjunctivae normal.      Pupils: Pupils are equal, round, and reactive to light.   Cardiovascular:      Rate and Rhythm: Normal rate and regular rhythm.   Pulmonary:      Effort: Pulmonary effort is normal.      Breath sounds: Normal breath sounds.   Abdominal:      General: Bowel sounds are normal.   Musculoskeletal:      Right lower leg: No edema.      Left lower leg: No edema.   Skin:     General: Skin is warm.      Capillary Refill: Capillary refill takes less than 2 seconds.   Neurological:      General: No focal deficit present.      Mental Status: She is alert and oriented to person, place, and time.   Psychiatric:         Mood and  "Affect: Mood normal.        BP (!) 145/100 (BP Location: Right arm, Patient Position: Sitting, BP Method: Medium (Automatic))   Pulse 74   Ht 5' 9" (1.753 m)   Wt 118 kg (260 lb 3.2 oz)   SpO2 100%   BMI 38.42 kg/m²     Assessment:       ICD-10-CM ICD-9-CM   1. Screening for colon cancer  Z12.11 V76.51   2. Iron deficiency anemia, unspecified iron deficiency anemia type  D50.9 280.9   3. Smoker  F17.200 305.1   4. Chronic pain of both knees  M25.561 719.46    M25.562 338.29    G89.29    5. Class 2 obesity without serious comorbidity with body mass index (BMI) of 38.0 to 38.9 in adult, unspecified obesity type  E66.9 278.00    Z68.38 V85.38   6. Encounter for screening for malignant neoplasm of breast, unspecified screening modality  Z12.39 V76.10       Plan:     Medication List with Changes/Refills   New Medications    NICOTINE (NICODERM CQ) 21 MG/24 HR    Place 1 patch onto the skin once daily.    PHENTERMINE (ADIPEX-P) 37.5 MG TABLET    Take 1 tablet (37.5 mg total) by mouth before breakfast.   Current Medications    FERROUS SULFATE (FEOSOL) 325 MG (65 MG IRON) TAB TABLET    Take 1 tablet (325 mg total) by mouth once daily. 1 tablet my mouth twice a day    MEDROXYPROGESTERONE (PROVERA) 5 MG TABLET    Take 5 mg by mouth once daily.    MELOXICAM (MOBIC) 15 MG TABLET    Take 15 mg by mouth once daily.    PANTOPRAZOLE (PROTONIX) 40 MG TABLET    Take 1 tablet (40 mg total) by mouth once daily.    TRAMADOL (ULTRAM) 50 MG TABLET    Take 50 mg by mouth every 4 (four) hours.   Changed and/or Refilled Medications    Modified Medication Previous Medication    ALBUTEROL (PROVENTIL/VENTOLIN HFA) 90 MCG/ACTUATION INHALER albuterol (PROVENTIL/VENTOLIN HFA) 90 mcg/actuation inhaler       INHALE 2 PUFFS BY MOUTH EVERY 2 TO 4 HOURS AS NEEDED FOR SHORTNESS OF BREATH OR WHEEZING    INHALE 2 PUFFS BY MOUTH EVERY 2 TO 4 HOURS AS NEEDED FOR SHORTNESS OF BREATH OR WHEEZING    FERROUS SULFATE (FEOSOL) 325 MG (65 MG IRON) TAB TABLET " ferrous sulfate (FEOSOL) 325 mg (65 mg iron) Tab tablet       Take 1 tablet (325 mg total) by mouth 2 (two) times a day.    Take 1 tablet (325 mg total) by mouth 2 (two) times a day.    IBUPROFEN (ADVIL,MOTRIN) 800 MG TABLET ibuprofen (ADVIL,MOTRIN) 800 MG tablet       Take 1 tablet (800 mg total) by mouth 3 (three) times daily.    Take 1 tablet (800 mg total) by mouth 3 (three) times daily.    MONTELUKAST (SINGULAIR) 10 MG TABLET montelukast (SINGULAIR) 10 mg tablet       Take 1 tablet (10 mg total) by mouth every evening.    TAKE 1 TABLET(10 MG) BY MOUTH EVERY EVENING        Screening for colon cancer  -     Ambulatory referral/consult to General Surgery; Future; Expected date: 03/16/2022    Iron deficiency anemia, unspecified iron deficiency anemia type  -     ferrous sulfate (FEOSOL) 325 mg (65 mg iron) Tab tablet; Take 1 tablet (325 mg total) by mouth 2 (two) times a day.  Dispense: 90 tablet; Refill: 0  -     Iron, TIBC and Ferritin Panel; Future; Expected date: 03/09/2022    Smoker  -     albuterol (PROVENTIL/VENTOLIN HFA) 90 mcg/actuation inhaler; INHALE 2 PUFFS BY MOUTH EVERY 2 TO 4 HOURS AS NEEDED FOR SHORTNESS OF BREATH OR WHEEZING  Dispense: 18 g; Refill: 3  -     montelukast (SINGULAIR) 10 mg tablet; Take 1 tablet (10 mg total) by mouth every evening.  Dispense: 90 tablet; Refill: 3  -     nicotine (NICODERM CQ) 21 mg/24 hr; Place 1 patch onto the skin once daily.  Dispense: 45 patch; Refill: 1    Chronic pain of both knees  -     ibuprofen (ADVIL,MOTRIN) 800 MG tablet; Take 1 tablet (800 mg total) by mouth 3 (three) times daily.  Dispense: 30 tablet; Refill: 0    Class 2 obesity without serious comorbidity with body mass index (BMI) of 38.0 to 38.9 in adult, unspecified obesity type  -     phentermine (ADIPEX-P) 37.5 mg tablet; Take 1 tablet (37.5 mg total) by mouth before breakfast.  Dispense: 30 tablet; Refill: 2    Encounter for screening for malignant neoplasm of breast, unspecified screening  modality  -     Mammo Digital Screening Bilat; Future; Expected date: 03/09/2022         20-minute visit. 5 minutes spent counseling patient on diet, exercise, and weight loss.       Assistance with smoking cessation was offered, including:  [x]  Medications  [x]  Counseling  []  Printed Information on Smoking Cessation  [x]  Referral to a Smoking Cessation Program    Patient was counseled regarding smoking for 3-10 minutes.

## 2022-03-10 ENCOUNTER — TELEPHONE (OUTPATIENT)
Dept: PRIMARY CARE CLINIC | Facility: CLINIC | Age: 57
End: 2022-03-10
Payer: MEDICAID

## 2022-03-10 LAB
CHOLEST SERPL-MCNC: 226 MG/DL
CHOLEST/HDLC SERPL: 3.5 (CALC)
FERRITIN SERPL-MCNC: 94 NG/ML (ref 16–232)
HDLC SERPL-MCNC: 64 MG/DL
IRON SATN MFR SERPL: 17 % (CALC) (ref 16–45)
IRON SERPL-MCNC: 62 MCG/DL (ref 45–160)
LDLC SERPL CALC-MCNC: 144 MG/DL (CALC)
NONHDLC SERPL-MCNC: 162 MG/DL (CALC)
TIBC SERPL-MCNC: 367 MCG/DL (CALC) (ref 250–450)
TRIGL SERPL-MCNC: 80 MG/DL

## 2022-03-10 NOTE — TELEPHONE ENCOUNTER
The patient did not fast for the lipid panel. Please advise. She states the last couple of days she has changed her diet. She noticed that after eating boiled crawfish on Thurs, Sunday and then boiled shrimp, she had swelling and noticed she lost 4lbs since. She advised she will not being eating it anymore since it affected her body as such.       The patient states Dr Martinez office is aware she cancelled.    Type Contact Phone/Fax User   03/09/2022 04:28 PM CST Phone (Outgoing) Mayra Bellamy (Self) [phone] (H) Aleksandra Venegas   CALLED TO [facility] COLONOSCOPY AND PT STATED WILL CALL BACK TO IN [facility] IN TWO WEEKS AFTER SHE RETURNS FROM VACATION. REQUEST CANCELLED -

## 2022-03-28 ENCOUNTER — PATIENT MESSAGE (OUTPATIENT)
Dept: PRIMARY CARE CLINIC | Facility: CLINIC | Age: 57
End: 2022-03-28
Payer: MEDICAID

## 2022-04-01 DIAGNOSIS — E78.5 HYPERLIPIDEMIA, UNSPECIFIED HYPERLIPIDEMIA TYPE: Primary | ICD-10-CM

## 2022-04-01 RX ORDER — ATORVASTATIN CALCIUM 40 MG/1
40 TABLET, FILM COATED ORAL DAILY
Qty: 90 TABLET | Refills: 3 | Status: SHIPPED | OUTPATIENT
Start: 2022-04-01 | End: 2023-05-04 | Stop reason: SDUPTHER

## 2022-04-13 DIAGNOSIS — D50.9 IRON DEFICIENCY ANEMIA, UNSPECIFIED IRON DEFICIENCY ANEMIA TYPE: ICD-10-CM

## 2022-04-13 NOTE — TELEPHONE ENCOUNTER
The patient is asking if she can have boiled crawfish on Friday. The last time she ate it, she had it two days in a row, along with boiled shrimp. She ended up with a lot of swelling. Can she have it but with a limit or not at all??     She is aware you will respond on tomorrow.       ----- Message from Patty Morales sent at 4/13/2022 11:01 AM CDT -----  Contact: self  Pt calling to refill on ferrous sulfate (FEOSOL) 325 mg (65 mg iron) and she can be reached at [phone]       [facility] #[id number] [address] AT [facility] [address] [address]  Phone: [phone] Fax: [phone]    Thanks,

## 2022-04-14 RX ORDER — FERROUS SULFATE 325(65) MG
325 TABLET ORAL 2 TIMES DAILY
Qty: 90 TABLET | Refills: 0 | Status: SHIPPED | OUTPATIENT
Start: 2022-04-14 | End: 2022-06-29

## 2022-05-02 ENCOUNTER — TELEPHONE (OUTPATIENT)
Dept: PRIMARY CARE CLINIC | Facility: CLINIC | Age: 57
End: 2022-05-02
Payer: MEDICAID

## 2022-05-02 NOTE — TELEPHONE ENCOUNTER
She is asking for a zpack. She states she went to [facility] on yesterday and was given a steriod shot for sinus infection. The patient states she needs the zpack. She is advised you will not be in until Thurs and states she does want to wait.     The patient was given her insurance number for her to call the number from the bill to update.    ----- Message from Gi Lares LPN sent at 4/29/2022  5:09 PM CDT -----    ----- Message -----  From: Earline Bradshaw  Sent: 4/29/2022   4:58 PM CDT  To: [facility] Staff    Pt would like a call back in regards to a bill she received for her labs done on 3/9/22 being billed to [facility] when she has [place] [employer], please call .[phone]

## 2022-05-05 ENCOUNTER — PATIENT MESSAGE (OUTPATIENT)
Dept: PRIMARY CARE CLINIC | Facility: CLINIC | Age: 57
End: 2022-05-05
Payer: MEDICAID

## 2022-05-05 DIAGNOSIS — J06.9 UPPER RESPIRATORY TRACT INFECTION, UNSPECIFIED TYPE: Primary | ICD-10-CM

## 2022-05-05 RX ORDER — AZITHROMYCIN 250 MG/1
TABLET, FILM COATED ORAL
Qty: 6 TABLET | Refills: 0 | Status: SHIPPED | OUTPATIENT
Start: 2022-05-05 | End: 2022-05-10

## 2022-06-23 DIAGNOSIS — N93.9 ABNORMAL VAGINAL BLEEDING: ICD-10-CM

## 2022-06-23 RX ORDER — FERROUS SULFATE 325(65) MG
325 TABLET ORAL DAILY
Qty: 90 TABLET | Refills: 3 | Status: SHIPPED | OUTPATIENT
Start: 2022-06-23 | End: 2023-04-26 | Stop reason: SDUPTHER

## 2022-06-23 NOTE — TELEPHONE ENCOUNTER
----- Message from Priscilla Ruiz sent at 6/23/2022 12:01 PM CDT -----  Contact: self  Type:  RX Refill Request    Who Called:  Mayra Bellamy   Refill or New Rx:   REFILL  RX Name and Strength: ferrous sulfate (FEOSOL) 325 mg (65 mg iron) Tab tablet  How is the patient currently taking it? (ex. 1XDay): 2 PILLS ONCE A DAY  Is this a 30 day or 90 day RX: 90 DAY     Preferred Pharmacy with phone number:   [facility] #[id number] - [address]  [address] [place] [address]  Phone: [phone] Fax: [phone]     Local or Mail Order: LOCAL  Ordering Provider: SU  Would the patient rather a call back or a response via [facility]?  CALL [facility]   Best Call Back Number: [phone]   Additional Information: URGENT PER PATIENT

## 2022-06-24 NOTE — TELEPHONE ENCOUNTER
Is the patient taking it one a day or BID.       ----- Message from Gi Lares LPN sent at 6/23/2022  4:42 PM CDT -----  Contact: Josefa    ----- Message -----  From: Priscilla Ruiz  Sent: 6/23/2022   4:39 PM CDT  To: [facility] Staff    Requesting a call back regarding clarification of a script Please call back at [phone]

## 2022-06-29 ENCOUNTER — TELEPHONE (OUTPATIENT)
Dept: PRIMARY CARE CLINIC | Facility: CLINIC | Age: 57
End: 2022-06-29
Payer: MEDICAID

## 2022-06-29 NOTE — TELEPHONE ENCOUNTER
----- Message from Priscilla Ruiz sent at 6/29/2022 11:48 AM CDT -----  Contact: self  Needs the doctor to contact the pharmacy to give the patient the generic version of iron pills - she can't use the brand name. She's been calling to have this done since Friday, she's out and needs them as soon as possible. Please respond today.    [facility] #[id number] - [place], [place] - [address] [facility] [address]  [address] [facility] [address]  Phone: [phone] Fax: [phone]

## 2022-09-06 ENCOUNTER — OFFICE VISIT (OUTPATIENT)
Dept: ORTHOPEDICS | Facility: CLINIC | Age: 57
End: 2022-09-06
Payer: MEDICAID

## 2022-09-06 DIAGNOSIS — M17.0 PRIMARY OSTEOARTHRITIS OF BOTH KNEES: Primary | ICD-10-CM

## 2022-09-06 PROCEDURE — 99213 PR OFFICE/OUTPT VISIT, EST, LEVL III, 20-29 MIN: ICD-10-PCS | Mod: 25,S$GLB,, | Performed by: ORTHOPAEDIC SURGERY

## 2022-09-06 PROCEDURE — 1159F PR MEDICATION LIST DOCUMENTED IN MEDICAL RECORD: ICD-10-PCS | Mod: CPTII,S$GLB,, | Performed by: ORTHOPAEDIC SURGERY

## 2022-09-06 PROCEDURE — 99213 OFFICE O/P EST LOW 20 MIN: CPT | Mod: 25,S$GLB,, | Performed by: ORTHOPAEDIC SURGERY

## 2022-09-06 PROCEDURE — 20610 LARGE JOINT ASPIRATION/INJECTION: BILATERAL KNEE: ICD-10-PCS | Mod: 50,S$GLB,, | Performed by: ORTHOPAEDIC SURGERY

## 2022-09-06 PROCEDURE — 1160F PR REVIEW ALL MEDS BY PRESCRIBER/CLIN PHARMACIST DOCUMENTED: ICD-10-PCS | Mod: CPTII,S$GLB,, | Performed by: ORTHOPAEDIC SURGERY

## 2022-09-06 PROCEDURE — 1159F MED LIST DOCD IN RCRD: CPT | Mod: CPTII,S$GLB,, | Performed by: ORTHOPAEDIC SURGERY

## 2022-09-06 PROCEDURE — 1160F RVW MEDS BY RX/DR IN RCRD: CPT | Mod: CPTII,S$GLB,, | Performed by: ORTHOPAEDIC SURGERY

## 2022-09-06 PROCEDURE — 20610 DRAIN/INJ JOINT/BURSA W/O US: CPT | Mod: 50,S$GLB,, | Performed by: ORTHOPAEDIC SURGERY

## 2022-09-06 RX ORDER — TRIAMCINOLONE ACETONIDE 40 MG/ML
20 INJECTION, SUSPENSION INTRA-ARTICULAR; INTRAMUSCULAR
Status: DISCONTINUED | OUTPATIENT
Start: 2022-09-06 | End: 2022-09-06 | Stop reason: HOSPADM

## 2022-09-06 RX ADMIN — TRIAMCINOLONE ACETONIDE 20 MG: 40 INJECTION, SUSPENSION INTRA-ARTICULAR; INTRAMUSCULAR at 02:09

## 2022-09-06 NOTE — PROGRESS NOTES
Subjective:      Patient ID: Mayra Bellamy is a 57 y.o. female.    Chief Complaint: Pain of the Right Knee    HPI 57-year-old lady with osteoarthritis of both knees comes in today for injection.    Review of Systems   Constitutional: Negative for fever and weight loss.   Cardiovascular:  Negative for chest pain and leg swelling.   Musculoskeletal:  Positive for arthritis, joint pain, joint swelling and stiffness. Negative for muscle weakness.   Gastrointestinal:  Negative for change in bowel habit.   Genitourinary:  Negative for bladder incontinence and hematuria.   Neurological:  Negative for focal weakness, numbness, paresthesias and sensory change.       Objective:      Active and passive range of motion of both knees is normal.  She has no effusion or pathologic laxity.  She has normal sensation and pulses.  She has mild varus alignment on standing      Ortho/SPM Exam            Assessment:       Encounter Diagnosis   Name Primary?    Primary osteoarthritis of both knees Yes          Plan:       Mayra was seen today for pain.    Diagnoses and all orders for this visit:    Primary osteoarthritis of both knees    Both knees are injected today.  Return p.r.n.

## 2022-09-06 NOTE — PROCEDURES
Large Joint Aspiration/Injection: bilateral knee    Date/Time: 9/6/2022 2:15 PM  Performed by: Timothy Andino MD  Authorized by: Timothy Andino MD     Consent Done?:  Yes (Verbal)  Prep: patient was prepped and draped in usual sterile fashion      Local anesthesia used?: Yes    Local anesthetic:  Lidocaine 2% without epinephrine  Anesthetic total (ml):  4      Details:  Needle Size:  25 G  Approach:  Anterior  Location:  Knee  Laterality:  Bilateral  Site:  Bilateral knee  Medications (Right):  20 mg triamcinolone acetonide 40 mg/mL  Medications (Left):  20 mg triamcinolone acetonide 40 mg/mL  Patient tolerance:  Patient tolerated the procedure well with no immediate complications

## 2022-11-16 ENCOUNTER — PATIENT OUTREACH (OUTPATIENT)
Dept: ADMINISTRATIVE | Facility: HOSPITAL | Age: 57
End: 2022-11-16
Payer: MEDICAID

## 2022-11-16 ENCOUNTER — PATIENT MESSAGE (OUTPATIENT)
Dept: ADMINISTRATIVE | Facility: HOSPITAL | Age: 57
End: 2022-11-16
Payer: MEDICAID

## 2022-11-16 NOTE — PROGRESS NOTES
LC Colon non-compliant: No Colonoscopy result found in [employer], [employer] and [facility] Everywhere or media.

## 2023-01-28 ENCOUNTER — PATIENT MESSAGE (OUTPATIENT)
Dept: ADMINISTRATIVE | Facility: HOSPITAL | Age: 58
End: 2023-01-28
Payer: MEDICAID

## 2023-02-17 ENCOUNTER — TELEPHONE (OUTPATIENT)
Dept: PRIMARY CARE CLINIC | Facility: CLINIC | Age: 58
End: 2023-02-17
Payer: MEDICAID

## 2023-02-17 DIAGNOSIS — R05.9 COUGH, UNSPECIFIED TYPE: Primary | ICD-10-CM

## 2023-02-17 RX ORDER — BENZONATATE 100 MG/1
100 CAPSULE ORAL 3 TIMES DAILY PRN
Qty: 30 CAPSULE | Refills: 2 | Status: SHIPPED | OUTPATIENT
Start: 2023-02-17 | End: 2023-02-27

## 2023-02-17 NOTE — TELEPHONE ENCOUNTER
----- Message from Jennifer Alfred sent at 2/17/2023  8:44 AM CST -----  Regarding: Sooner appt  Contact: patient  .Type:  Sooner Apoointment Request    Caller is requesting a sooner appointment.  Caller declined first available appointment listed below.  Caller will not accept being placed on the waitlist and is requesting a message be sent to doctor.  Name of Caller:Mayra  When is the first available appointment?5/10  Symptoms:cough,sweating and shortness of breath  Would the patient rather a call back or a response via [facility]? Call back  Best Call Back Number:[phone]    Additional Information: PT states she has not been feeling well for awhile and would like an appt asap.

## 2023-02-17 NOTE — TELEPHONE ENCOUNTER
----- Message from Rachel Kate sent at 2/17/2023  9:00 AM CST -----  Contact: Mayra Nunez needs another call back at [phone], Regards to her medication she was speak with the nurse and somehow the call drop. She stated that her OBGYN has her taken a sugar birth control pill for clottings and also she is on iron pills.      Thanks  Td

## 2023-02-17 NOTE — TELEPHONE ENCOUNTER
"The patient called to say she has been to Urgent Care x's 2, due to a cold in the past 3 weeks. She states she was given a "steriod shot and a zpack but I am still sweating after a shower in the late evening, I still have a lingering coug and chest tightness. Can she send me out some cough pills?"         You 21 minutes ago (8:52 AM)     CL  ----- Message from Jennifer Simon sent at 2/17/2023  8:44 AM CST -----  Regarding: Sooner appt  Contact: patient  .Type:  Sooner Apoointment Request     Caller is requesting a sooner appointment.  Caller declined first available appointment listed below.  Caller will not accept being placed on the waitlist and is requesting a message be sent to doctor.  Name of Caller:Mayra  When is the first available appointment?5/10  Symptoms:cough,sweating and shortness of breath  Would the patient rather a call back or a response via [facility]? Call back  Best Call Back Number:[phone]     Additional Information: PT states she has not been feeling well for awhile and would like an appt asap.               "

## 2023-02-17 NOTE — TELEPHONE ENCOUNTER
----- Message from Lavern Glover sent at 2/17/2023  2:07 PM CST -----  Regarding: Sugar Pills  Contact: patient  Per phone call with patient, she advised that her that Demetrius Cortez is her OBGYN. Please return call at [phone] (home).    Thanks,  SJ

## 2023-02-24 ENCOUNTER — PATIENT MESSAGE (OUTPATIENT)
Dept: OBSTETRICS AND GYNECOLOGY | Facility: CLINIC | Age: 58
End: 2023-02-24
Payer: MEDICAID

## 2023-03-09 ENCOUNTER — OFFICE VISIT (OUTPATIENT)
Dept: OBSTETRICS AND GYNECOLOGY | Facility: CLINIC | Age: 58
End: 2023-03-09
Payer: MEDICAID

## 2023-03-09 ENCOUNTER — PROCEDURE VISIT (OUTPATIENT)
Dept: OBSTETRICS AND GYNECOLOGY | Facility: CLINIC | Age: 58
End: 2023-03-09
Payer: MEDICAID

## 2023-03-09 DIAGNOSIS — N95.0 POSTMENOPAUSAL BLEEDING: ICD-10-CM

## 2023-03-09 DIAGNOSIS — N95.0 POSTMENOPAUSAL BLEEDING: Primary | ICD-10-CM

## 2023-03-09 DIAGNOSIS — D21.9 LEIOMYOMA: Primary | ICD-10-CM

## 2023-03-09 PROCEDURE — 99213 PR OFFICE/OUTPT VISIT, EST, LEVL III, 20-29 MIN: ICD-10-PCS | Mod: 25,S$GLB,, | Performed by: OBSTETRICS & GYNECOLOGY

## 2023-03-09 PROCEDURE — 99213 OFFICE O/P EST LOW 20 MIN: CPT | Mod: 25,S$GLB,, | Performed by: OBSTETRICS & GYNECOLOGY

## 2023-03-09 PROCEDURE — 76830 US OB/GYN PROCEDURE (VIEWPOINT): ICD-10-PCS | Mod: S$GLB,,, | Performed by: OBSTETRICS & GYNECOLOGY

## 2023-03-09 PROCEDURE — 76830 TRANSVAGINAL US NON-OB: CPT | Mod: S$GLB,,, | Performed by: OBSTETRICS & GYNECOLOGY

## 2023-03-09 NOTE — PROGRESS NOTES
57-year-old female who in December of 2020 had some postmenopausal bleeding was found to have multiple fibroids up to 10 cm a mean to 6 cm repeat ultrasound today shows endometrial thickness of 4.63 fibroids 28 x 32 27 x 28 42 x 31 she is had no bleeding her hot flashes are still there but not too bad I have recommended black cautious her estroven I primarily the black cohosh.  I think we can take her off the Provera and will see how did spend about 20 minutes talking to the patient face-to-face

## 2023-03-17 ENCOUNTER — TELEPHONE (OUTPATIENT)
Dept: PRIMARY CARE CLINIC | Facility: CLINIC | Age: 58
End: 2023-03-17
Payer: MEDICAID

## 2023-03-17 NOTE — TELEPHONE ENCOUNTER
----- Message from Ade Brown sent at 3/17/2023  8:13 AM CDT -----  ,Type:  Mammogram    Caller is requesting to schedule their annual mammogram appointment.  Order is not listed in EPIC.  Please enter order and contact patient to schedule.  Name of Caller:Mayra Bellamy    Where would they like the mammogram performed?-  Would the patient rather a call back or a response via [facility]? -  Best Call Back Number:[phone]    Additional Information: pl needs mammo orders sent ( to which ever facility you sent them to last year) please call to notify pt where the orders were sent

## 2023-03-20 DIAGNOSIS — Z12.39 ENCOUNTER FOR SCREENING FOR MALIGNANT NEOPLASM OF BREAST, UNSPECIFIED SCREENING MODALITY: Primary | ICD-10-CM

## 2023-03-31 ENCOUNTER — PATIENT MESSAGE (OUTPATIENT)
Dept: FAMILY MEDICINE | Facility: CLINIC | Age: 58
End: 2023-03-31
Payer: MEDICAID

## 2023-04-11 ENCOUNTER — TELEPHONE (OUTPATIENT)
Dept: PRIMARY CARE CLINIC | Facility: CLINIC | Age: 58
End: 2023-04-11
Payer: MEDICAID

## 2023-04-11 NOTE — TELEPHONE ENCOUNTER
The patient has been given the number to call for scheduling. I also did refax the order again.     ----- Message from Lavern Glover sent at 4/11/2023  8:45 AM CDT -----  Regarding: Mammograms  Contact: patient  Per phone call with patient, she stated that she requested a mammogram 2 or 3 weeks ago and no one has return her call as to when and where to go for the Mammogram.  Please return call at [phone] (home).    Thanks,  SJ

## 2023-04-18 LAB — BCS RECOMMENDATION EXT: NORMAL

## 2023-04-20 ENCOUNTER — PATIENT OUTREACH (OUTPATIENT)
Dept: ADMINISTRATIVE | Facility: HOSPITAL | Age: 58
End: 2023-04-20
Payer: MEDICAID

## 2023-04-26 DIAGNOSIS — U07.1 COVID-19: Primary | ICD-10-CM

## 2023-04-26 DIAGNOSIS — N93.9 ABNORMAL VAGINAL BLEEDING: ICD-10-CM

## 2023-04-26 RX ORDER — AZITHROMYCIN 250 MG/1
TABLET, FILM COATED ORAL
Qty: 6 TABLET | Refills: 0 | Status: SHIPPED | OUTPATIENT
Start: 2023-04-26 | End: 2023-05-01

## 2023-04-26 RX ORDER — METHYLPREDNISOLONE 4 MG/1
TABLET ORAL
Qty: 21 EACH | Refills: 0 | Status: SHIPPED | OUTPATIENT
Start: 2023-04-26 | End: 2023-05-04 | Stop reason: ALTCHOICE

## 2023-04-26 NOTE — TELEPHONE ENCOUNTER
----- Message from Sarah Silva sent at 4/26/2023 11:23 AM CDT -----  Patient is calling in regards to will need  medication called in for iron..Please call her back at  [phone]

## 2023-04-26 NOTE — TELEPHONE ENCOUNTER
----- Message from Lavern Adalberto sent at 4/26/2023  4:02 PM CDT -----  Regarding: Refill  Contact: patient  Type:  RX Refill Request    Who Called: Mayra   Refill or New Rx: refill   RX Name and Strength: FEROSUL 325 mg (65 mg iron) Tab tablet  How is the patient currently taking it? (ex. 1XDay):daily   Is this a 30 day or 90 day RX:30  Preferred Pharmacy with phone number:   [id number] - [place], [place] - [address] [facility] [address] [facility]  [address] [place] [id number]  Phone: [phone] Fax: [phone]      Local or Mail Order:local   Ordering Provider: Dr Maryjo Bolton   Would the patient rather a call back or a response via [facility]?  Call back   Best Call Back Number: [phone] (home)     Additional Information: The caller would like for the physician to call out a medication for her to sleep at night    Thanks,  ORLIN

## 2023-05-03 ENCOUNTER — TELEPHONE (OUTPATIENT)
Dept: PRIMARY CARE CLINIC | Facility: CLINIC | Age: 58
End: 2023-05-03
Payer: MEDICAID

## 2023-05-03 RX ORDER — FERROUS SULFATE 325(65) MG
325 TABLET ORAL DAILY
Qty: 90 TABLET | Refills: 3 | Status: SHIPPED | OUTPATIENT
Start: 2023-05-03 | End: 2023-05-04 | Stop reason: SDUPTHER

## 2023-05-03 NOTE — TELEPHONE ENCOUNTER
Please call to schedule this patient an appt to see Dr Bolton. She saw Dr Bolton last on 03/2022. Thank you.       ----- Message from Mirlande Rawls sent at 5/3/2023  9:38 AM CDT -----  Contact: Patient  Patient called to consult with nurse or staff regarding her iron medication. She states she is needing a refill on the iron medication and wanted to speak with office about getting sleep medication as well. She would like the medication to go to   [facility][id number] - [address] AT [facility] [address]  [address] [place] [address]  Phone: [phone] Fax: [phone]  Patient would like a call back and can be reached at [phone]. Thanks/

## 2023-05-04 ENCOUNTER — OFFICE VISIT (OUTPATIENT)
Dept: PRIMARY CARE CLINIC | Facility: CLINIC | Age: 58
End: 2023-05-04
Payer: MEDICAID

## 2023-05-04 VITALS
BODY MASS INDEX: 36.68 KG/M2 | OXYGEN SATURATION: 99 % | WEIGHT: 262 LBS | HEART RATE: 70 BPM | SYSTOLIC BLOOD PRESSURE: 132 MMHG | DIASTOLIC BLOOD PRESSURE: 78 MMHG | HEIGHT: 71 IN

## 2023-05-04 DIAGNOSIS — N93.9 ABNORMAL VAGINAL BLEEDING: ICD-10-CM

## 2023-05-04 DIAGNOSIS — E78.5 HYPERLIPIDEMIA, UNSPECIFIED HYPERLIPIDEMIA TYPE: ICD-10-CM

## 2023-05-04 DIAGNOSIS — M25.562 CHRONIC PAIN OF BOTH KNEES: ICD-10-CM

## 2023-05-04 DIAGNOSIS — G47.00 INSOMNIA, UNSPECIFIED TYPE: Primary | ICD-10-CM

## 2023-05-04 DIAGNOSIS — G89.29 CHRONIC PAIN OF BOTH KNEES: ICD-10-CM

## 2023-05-04 DIAGNOSIS — M25.561 CHRONIC PAIN OF BOTH KNEES: ICD-10-CM

## 2023-05-04 DIAGNOSIS — Z12.11 SCREENING FOR COLON CANCER: ICD-10-CM

## 2023-05-04 DIAGNOSIS — D50.9 IRON DEFICIENCY ANEMIA, UNSPECIFIED IRON DEFICIENCY ANEMIA TYPE: ICD-10-CM

## 2023-05-04 DIAGNOSIS — F17.200 SMOKER: ICD-10-CM

## 2023-05-04 LAB
% SATURATION: 17 % (ref 20–50)
ANION GAP SERPL CALC-SCNC: 5 MMOL/L (ref 3–11)
BASOPHILS NFR BLD: 0.1 % (ref 0–3)
BUN SERPL-MCNC: 8 MG/DL (ref 7–18)
BUN/CREAT SERPL: 8.42 RATIO (ref 7–18)
CALCIUM SERPL-MCNC: 9 MG/DL (ref 8.8–10.5)
CHLORIDE SERPL-SCNC: 104 MMOL/L (ref 100–108)
CHOLEST SERPL-MSCNC: 181 MG/DL
CO2 SERPL-SCNC: 34 MMOL/L (ref 21–32)
CREAT SERPL-MCNC: 0.95 MG/DL (ref 0.55–1.02)
EOSINOPHIL NFR BLD: 3.6 % (ref 1–3)
ERYTHROCYTE [DISTWIDTH] IN BLOOD BY AUTOMATED COUNT: 15.1 % (ref 12.5–18)
FERRITIN SERPL-MCNC: 246 NG/ML (ref 8–388)
GFR ESTIMATION: > 60
GLUCOSE SERPL-MCNC: 76 MG/DL (ref 70–110)
HCT VFR BLD AUTO: 42.8 % (ref 37–47)
HDL/CHOLESTEROL RATIO: 2.5 RATIO
HDLC SERPL-MCNC: 71 MG/DL (ref 39–96)
HGB BLD-MCNC: 14.1 G/DL (ref 12–16)
IRON: 54 UG/DL (ref 26–170)
LDLC SERPL CALC-MCNC: 82 MG/DL
LYMPHOCYTES NFR BLD: 26.2 % (ref 25–40)
MCH RBC QN AUTO: 30.5 PG (ref 27–31.2)
MCHC RBC AUTO-ENTMCNC: 32.9 G/DL (ref 31.8–35.4)
MCV RBC AUTO: 92.4 FL (ref 80–97)
MONOCYTES NFR BLD: 9.7 % (ref 1–15)
NEUTROPHILS # BLD AUTO: 5.18 10*3/UL (ref 1.8–7.7)
NEUTROPHILS NFR BLD: 59.9 % (ref 37–80)
NUCLEATED RED BLOOD CELLS: 0 %
PLATELETS: 282 10*3/UL (ref 142–424)
POTASSIUM SERPL-SCNC: 3.8 MMOL/L (ref 3.6–5.2)
RBC # BLD AUTO: 4.63 10*6/UL (ref 4.2–5.4)
SODIUM BLD-SCNC: 143 MMOL/L (ref 135–145)
TOTAL IRON BINDING CAPACITY: 327 UG/DL (ref 250–450)
TRIGL SERPL-MCNC: 140 MG/DL (ref 30–200)
VLDL CHOLESTEROL: 28 MG/DL (ref 0–40)
WBC # BLD: 8.6 10*3/UL (ref 4.6–10.2)

## 2023-05-04 PROCEDURE — 3008F PR BODY MASS INDEX (BMI) DOCUMENTED: ICD-10-PCS | Mod: CPTII,S$GLB,, | Performed by: INTERNAL MEDICINE

## 2023-05-04 PROCEDURE — 3075F SYST BP GE 130 - 139MM HG: CPT | Mod: CPTII,S$GLB,, | Performed by: INTERNAL MEDICINE

## 2023-05-04 PROCEDURE — 3078F DIAST BP <80 MM HG: CPT | Mod: CPTII,S$GLB,, | Performed by: INTERNAL MEDICINE

## 2023-05-04 PROCEDURE — 99214 PR OFFICE/OUTPT VISIT, EST, LEVL IV, 30-39 MIN: ICD-10-PCS | Mod: S$GLB,,, | Performed by: INTERNAL MEDICINE

## 2023-05-04 PROCEDURE — 3075F PR MOST RECENT SYSTOLIC BLOOD PRESS GE 130-139MM HG: ICD-10-PCS | Mod: CPTII,S$GLB,, | Performed by: INTERNAL MEDICINE

## 2023-05-04 PROCEDURE — 3008F BODY MASS INDEX DOCD: CPT | Mod: CPTII,S$GLB,, | Performed by: INTERNAL MEDICINE

## 2023-05-04 PROCEDURE — 99214 OFFICE O/P EST MOD 30 MIN: CPT | Mod: S$GLB,,, | Performed by: INTERNAL MEDICINE

## 2023-05-04 PROCEDURE — 3078F PR MOST RECENT DIASTOLIC BLOOD PRESSURE < 80 MM HG: ICD-10-PCS | Mod: CPTII,S$GLB,, | Performed by: INTERNAL MEDICINE

## 2023-05-04 PROCEDURE — 1159F PR MEDICATION LIST DOCUMENTED IN MEDICAL RECORD: ICD-10-PCS | Mod: CPTII,S$GLB,, | Performed by: INTERNAL MEDICINE

## 2023-05-04 PROCEDURE — 1159F MED LIST DOCD IN RCRD: CPT | Mod: CPTII,S$GLB,, | Performed by: INTERNAL MEDICINE

## 2023-05-04 RX ORDER — MONTELUKAST SODIUM 10 MG/1
10 TABLET ORAL NIGHTLY
Qty: 90 TABLET | Refills: 3 | Status: SHIPPED | OUTPATIENT
Start: 2023-05-04 | End: 2023-11-08 | Stop reason: SDUPTHER

## 2023-05-04 RX ORDER — PANTOPRAZOLE SODIUM 40 MG/1
40 TABLET, DELAYED RELEASE ORAL DAILY
Qty: 30 TABLET | Refills: 0 | Status: SHIPPED | OUTPATIENT
Start: 2023-05-04 | End: 2023-05-31

## 2023-05-04 RX ORDER — IBUPROFEN 800 MG/1
800 TABLET ORAL 3 TIMES DAILY
Qty: 30 TABLET | Refills: 0 | Status: SHIPPED | OUTPATIENT
Start: 2023-05-04

## 2023-05-04 RX ORDER — FERROUS SULFATE 325(65) MG
325 TABLET ORAL DAILY
Qty: 90 TABLET | Refills: 3 | Status: SHIPPED | OUTPATIENT
Start: 2023-05-04 | End: 2023-09-06 | Stop reason: SDUPTHER

## 2023-05-04 RX ORDER — TRAZODONE HYDROCHLORIDE 50 MG/1
50 TABLET ORAL NIGHTLY
Qty: 30 TABLET | Refills: 3 | Status: SHIPPED | OUTPATIENT
Start: 2023-05-04 | End: 2023-08-10

## 2023-05-04 RX ORDER — TRAMADOL HYDROCHLORIDE 50 MG/1
50 TABLET ORAL EVERY 4 HOURS
Status: CANCELLED | OUTPATIENT
Start: 2023-05-04

## 2023-05-04 RX ORDER — BENZONATATE 100 MG/1
100 CAPSULE ORAL 3 TIMES DAILY PRN
Qty: 30 CAPSULE | Refills: 0 | Status: SHIPPED | OUTPATIENT
Start: 2023-05-04 | End: 2023-05-29

## 2023-05-04 RX ORDER — ATORVASTATIN CALCIUM 40 MG/1
40 TABLET, FILM COATED ORAL DAILY
Qty: 90 TABLET | Refills: 3 | Status: SHIPPED | OUTPATIENT
Start: 2023-05-04 | End: 2023-08-10

## 2023-05-04 NOTE — PROGRESS NOTES
Subjective:      Patient ID: Mayra Bellamy is a 57 y.o. female.    Chief Complaint: Follow-up, Medication Refill, and Insomnia (Since she has been off of her iron tabs. It started on last week. She sleeps at 10 pm and up at 3 am /Her 90 year old mother has cancer and her sister who is 60 also has cancer. )    HPI    Past Medical History:   Diagnosis Date    Anemia     Arthritis          Patient here for follow up. She has a h/o leiomyoma and her gynecologist is Dr Cortez. She states she has been out of her iron pills for a week and feels sluggish   She is no longer having vaginal bleeding   She was referred to Dr Martinez for colonoscopy but she states she didn't get a call back       Review of Systems   Constitutional:  Negative for chills and fever.   Respiratory:  Negative for cough, shortness of breath and wheezing.    Cardiovascular:  Negative for chest pain, palpitations and leg swelling.   Gastrointestinal:  Negative for abdominal pain, constipation, diarrhea, nausea and vomiting.   Genitourinary:  Negative for dysuria, frequency and urgency.   Musculoskeletal:  Negative for falls.   Skin:  Negative for rash.   Neurological:  Negative for dizziness and headaches.   Psychiatric/Behavioral:  Negative for depression. The patient has insomnia. The patient is not nervous/anxious.         Recent stressors   Objective:     Physical Exam  Vitals reviewed.   Constitutional:       Appearance: Normal appearance.   HENT:      Head: Normocephalic.   Eyes:      Extraocular Movements: Extraocular movements intact.      Conjunctiva/sclera: Conjunctivae normal.      Pupils: Pupils are equal, round, and reactive to light.   Cardiovascular:      Rate and Rhythm: Normal rate and regular rhythm.   Pulmonary:      Effort: Pulmonary effort is normal.      Breath sounds: Normal breath sounds.   Abdominal:      General: Bowel sounds are normal.   Musculoskeletal:         General: Normal range of motion.      Right lower leg: No edema.  "     Left lower leg: No edema.   Skin:     General: Skin is warm.      Capillary Refill: Capillary refill takes less than 2 seconds.   Neurological:      General: No focal deficit present.      Mental Status: She is alert and oriented to person, place, and time.   Psychiatric:         Mood and Affect: Mood normal.     /78 (BP Location: Left arm, Patient Position: Sitting, BP Method: X-Large (Automatic))   Pulse 70   Ht 5' 11" (1.803 m)   Wt 118.8 kg (262 lb)   SpO2 99%   BMI 36.54 kg/m²     Assessment:       ICD-10-CM ICD-9-CM   1. Smoker  F17.200 305.1   2. Hyperlipidemia, unspecified hyperlipidemia type  E78.5 272.4   3. Abnormal vaginal bleeding  N93.9 623.8   4. Chronic pain of both knees  M25.561 719.46    M25.562 338.29    G89.29    5. Iron deficiency anemia, unspecified iron deficiency anemia type  D50.9 280.9   6. Screening for colon cancer  Z12.11 V76.51       Plan:     Medication List with Changes/Refills   New Medications    BENZONATATE (TESSALON) 100 MG CAPSULE    Take 1 capsule (100 mg total) by mouth 3 (three) times daily as needed for Cough.   Current Medications    ALBUTEROL (PROVENTIL/VENTOLIN HFA) 90 MCG/ACTUATION INHALER    INHALE 2 PUFFS BY MOUTH EVERY 2 TO 4 HOURS AS NEEDED FOR SHORTNESS OF BREATH OR WHEEZING   Changed and/or Refilled Medications    Modified Medication Previous Medication    ATORVASTATIN (LIPITOR) 40 MG TABLET atorvastatin (LIPITOR) 40 MG tablet       Take 1 tablet (40 mg total) by mouth once daily.    Take 1 tablet (40 mg total) by mouth once daily.    FERROUS SULFATE (FEOSOL) 325 MG (65 MG IRON) TAB TABLET ferrous sulfate (FEOSOL) 325 mg (65 mg iron) Tab tablet       Take 1 tablet (325 mg total) by mouth once daily. 1 tablet my mouth twice a day    Take 1 tablet (325 mg total) by mouth once daily. 1 tablet my mouth twice a day    IBUPROFEN (ADVIL,MOTRIN) 800 MG TABLET ibuprofen (ADVIL,MOTRIN) 800 MG tablet       Take 1 tablet (800 mg total) by mouth 3 (three) times " daily.    Take 1 tablet (800 mg total) by mouth 3 (three) times daily.    MONTELUKAST (SINGULAIR) 10 MG TABLET montelukast (SINGULAIR) 10 mg tablet       Take 1 tablet (10 mg total) by mouth every evening.    TAKE 1 TABLET(10 MG) BY MOUTH EVERY EVENING    PANTOPRAZOLE (PROTONIX) 40 MG TABLET pantoprazole (PROTONIX) 40 MG tablet       Take 1 tablet (40 mg total) by mouth once daily.    Take 1 tablet (40 mg total) by mouth once daily.   Discontinued Medications    MEDROXYPROGESTERONE (PROVERA) 5 MG TABLET    Take 5 mg by mouth once daily.    MELOXICAM (MOBIC) 15 MG TABLET    Take 15 mg by mouth once daily.    METHYLPREDNISOLONE (MEDROL DOSEPACK) 4 MG TABLET    use as directed    NICOTINE (NICODERM CQ) 21 MG/24 HR    Place 1 patch onto the skin once daily.    TRAMADOL (ULTRAM) 50 MG TABLET    Take 50 mg by mouth every 4 (four) hours.        1. Smoker  -     Ambulatory referral/consult to Smoking Cessation Program; Future; Expected date: 05/11/2023  -     montelukast (SINGULAIR) 10 mg tablet; Take 1 tablet (10 mg total) by mouth every evening.  Dispense: 90 tablet; Refill: 3  -     benzonatate (TESSALON) 100 MG capsule; Take 1 capsule (100 mg total) by mouth 3 (three) times daily as needed for Cough.  Dispense: 30 capsule; Refill: 0    2. Hyperlipidemia, unspecified hyperlipidemia type  -     atorvastatin (LIPITOR) 40 MG tablet; Take 1 tablet (40 mg total) by mouth once daily.  Dispense: 90 tablet; Refill: 3  -     Lipid Panel; Future; Expected date: 05/04/2023    3. Abnormal vaginal bleeding  -     ferrous sulfate (FEOSOL) 325 mg (65 mg iron) Tab tablet; Take 1 tablet (325 mg total) by mouth once daily. 1 tablet my mouth twice a day  Dispense: 90 tablet; Refill: 3    4. Chronic pain of both knees  -     ibuprofen (ADVIL,MOTRIN) 800 MG tablet; Take 1 tablet (800 mg total) by mouth 3 (three) times daily.  Dispense: 30 tablet; Refill: 0    5. Iron deficiency anemia, unspecified iron deficiency anemia type  -     Iron,  TIBC and Ferritin Panel; Future; Expected date: 05/04/2023  -     CBC Auto Differential; Future; Expected date: 05/04/2023  -     Basic Metabolic Panel; Future; Expected date: 05/04/2023    6. Screening for colon cancer  -     Ambulatory referral/consult to General Surgery; Future; Expected date: 05/11/2023    Other orders  -     pantoprazole (PROTONIX) 40 MG tablet; Take 1 tablet (40 mg total) by mouth once daily.  Dispense: 30 tablet; Refill: 0           Future Appointments   Date Time Provider Department Center   6/23/2023 10:30 AM NURSE [place], GENERAL SURGERY [facility]   6[facility]30[facility]  [facility]   9/6/2023 12:40 PM Maryjo Bolton MD [facility]

## 2023-05-10 ENCOUNTER — PATIENT MESSAGE (OUTPATIENT)
Dept: PRIMARY CARE CLINIC | Facility: CLINIC | Age: 58
End: 2023-05-10
Payer: MEDICAID

## 2023-05-20 DIAGNOSIS — F17.200 SMOKER: ICD-10-CM

## 2023-05-22 RX ORDER — ALBUTEROL SULFATE 90 UG/1
AEROSOL, METERED RESPIRATORY (INHALATION)
Qty: 18 G | Refills: 3 | Status: SHIPPED | OUTPATIENT
Start: 2023-05-22

## 2023-05-29 DIAGNOSIS — F17.200 SMOKER: ICD-10-CM

## 2023-05-29 RX ORDER — BENZONATATE 100 MG/1
CAPSULE ORAL
Qty: 30 CAPSULE | Refills: 0 | Status: SHIPPED | OUTPATIENT
Start: 2023-05-29 | End: 2023-08-07 | Stop reason: SDUPTHER

## 2023-05-31 RX ORDER — PANTOPRAZOLE SODIUM 40 MG/1
TABLET, DELAYED RELEASE ORAL
Qty: 90 TABLET | Refills: 3 | Status: SHIPPED | OUTPATIENT
Start: 2023-05-31

## 2023-08-07 DIAGNOSIS — F17.200 SMOKER: ICD-10-CM

## 2023-08-07 RX ORDER — BENZONATATE 100 MG/1
CAPSULE ORAL
Qty: 30 CAPSULE | Refills: 0 | Status: SHIPPED | OUTPATIENT
Start: 2023-08-07 | End: 2023-09-05

## 2023-08-07 NOTE — TELEPHONE ENCOUNTER
----- Message from Deisi Denney sent at 8/4/2023  9:44 AM CDT -----  Contact: Pt  Type:  Needs Medical Advice    Who Called:  pt   Symptoms (please be specific):  fluid / req to have something called in for the fluid/ steroid injection  How long has patient had these symptoms: about 2 weeks  Pharmacy name and phone #:  [facility]   Would the patient rather a call back or a response via [facility]? Phone & pt portal   Best Call Back Number: [phone]  Additional Information: had an injection in her ankle and gained approx 7lbs       Type:  RX Refill Request    Who Called: pt  Refill or New Rx: refill   RX Name and Strength: benzonatate (TESSALON) 100 MG capsule  How is the patient currently taking it? (ex. 1XDay): 3 times   Is this a 30 day or 90 day RX: 90 days  Pref[facility] with phone number: [facility]   Local or Mail Order: local   Ordering Provider: sabina   Would the patient rather a call back or a response via [facility]? Phone /portal  Best Call Back Number:[phone]  Additional Information:          [id number] - [place] [address] [address]  AT [facility] & [address] [address]  Phone: [phone] Fax: [phone]

## 2023-08-10 ENCOUNTER — CLINICAL SUPPORT (OUTPATIENT)
Dept: SURGERY | Facility: CLINIC | Age: 58
End: 2023-08-10
Payer: MEDICAID

## 2023-08-10 DIAGNOSIS — Z12.11 COLON CANCER SCREENING: Primary | ICD-10-CM

## 2023-08-10 PROCEDURE — 99499 NO LOS: ICD-10-PCS | Mod: S$GLB,,, | Performed by: SURGERY

## 2023-08-10 PROCEDURE — 99499 UNLISTED E&M SERVICE: CPT | Mod: S$GLB,,, | Performed by: SURGERY

## 2023-08-10 RX ORDER — ACETAMINOPHEN 500 MG
500 TABLET ORAL
COMMUNITY
Start: 2023-07-15

## 2023-08-14 RX ORDER — SOD SULF/POT CHLORIDE/MAG SULF 1.479 G
12 TABLET ORAL DAILY
Qty: 24 TABLET | Refills: 0 | Status: SHIPPED | OUTPATIENT
Start: 2023-08-14

## 2023-08-14 NOTE — PROGRESS NOTES
[facility] - Gastroenterology  [address] [place] [address]  Phone: [phone]  Fax: [phone]    History & Physical         Provider: Jason Martinez    Patient Name: Mayra RIVERA (age):1965  58 y.o.           Gender: female   Phone: [phone]     Referring Physician: Maryjo Bolton     Vital Signs:   Height -   Weight -   BMI -      Plan: colonoscopy      Encounter Diagnosis   Name Primary?    Colon cancer screening Yes           History:      Past Medical History:   Diagnosis Date    Anemia     Arthritis       Past Surgical History:   Procedure Laterality Date    HERNIA REPAIR      KNEE SURGERY      ROBOT-ASSISTED REPAIR OF UMBILICAL HERNIA USING DA BRUCE XI  2021      Medication List with Changes/Refills   Current Medications    ACETAMINOPHEN (TYLENOL) 500 MG TABLET    Take 500 mg by mouth every 4 to 6 hours as needed.    ALBUTEROL (PROVENTIL/VENTOLIN HFA) 90 MCG/ACTUATION INHALER    INHALE 2 PUFFS BY MOUTH EVERY 2 TO 4 HOURS AS NEEDED FOR SHORTNESS OF BREATH OR WHEEZING    BENZONATATE (TESSALON) 100 MG CAPSULE    TAKE 1 CAPSULE(100 MG) BY MOUTH THREE TIMES DAILY AS NEEDED FOR COUGH    FERROUS SULFATE (FEOSOL) 325 MG (65 MG IRON) TAB TABLET    Take 1 tablet (325 mg total) by mouth once daily. 1 tablet my mouth twice a day    IBUPROFEN (ADVIL,MOTRIN) 800 MG TABLET    Take 1 tablet (800 mg total) by mouth 3 (three) times daily.    MONTELUKAST (SINGULAIR) 10 MG TABLET    Take 1 tablet (10 mg total) by mouth every evening.    PANTOPRAZOLE (PROTONIX) 40 MG TABLET    TAKE 1 TABLET(40 MG) BY MOUTH EVERY DAY   Discontinued Medications    ATORVASTATIN (LIPITOR) 40 MG TABLET    Take 1 tablet (40 mg total) by mouth once daily.    TRAZODONE (DESYREL) 50 MG TABLET    Take 1 tablet (50 mg total) by mouth every evening.      Review of patient's allergies indicates:   Allergen Reactions    Codeine       Family  History   Problem Relation Age of Onset    Hypertension Mother     Breast cancer Mother     Hypertension Sister     Hypertension Brother       Social History     Tobacco Use    Smoking status: Every Day     Current packs/day: 1.00     Average packs/day: 1 pack/day for 10.0 years (10.0 ttl pk-yrs)     Types: Cigarettes     Passive exposure: Current    Smokeless tobacco: Never   Substance Use Topics    Alcohol use: Yes    Drug use: Not Currently     Comment: Marijuana in past         Physical Examination:     General Appearance:___________________________  HEENT: _____________________________________  Abdomen:____________________________________  Heart:________________________________________  Lungs:_______________________________________  Extremities:___________________________________  Skin:_________________________________________  Endocrine:____________________________________  Genitourinary:_________________________________  Neurological:__________________________________      Patient has been evaluated immediately prior to sedation and is medically cleared for endoscopy with IVCS as an ASA class: ______      Physician Signature: _________________________       Date: ________  Time: ________

## 2023-08-17 ENCOUNTER — TELEPHONE (OUTPATIENT)
Dept: SURGERY | Facility: CLINIC | Age: 58
End: 2023-08-17
Payer: MEDICAID

## 2023-08-17 NOTE — TELEPHONE ENCOUNTER
Returned pt's call, but was unable to reach. LM stating that the hospital will call today between 2-4pm with arrival time for tomorrow. The [facility] does Dr. Martinez surgery schedule, so whatever time we have pt on the schedule is not the correct time.

## 2023-08-17 NOTE — TELEPHONE ENCOUNTER
----- Message from Ashley Andersen sent at 8/17/2023  9:51 AM CDT -----  Contact: Mayra Zambrano is calling in regards to colonoscopy tomorrow. Reports wanting to know exactly what time to come in tomorrow, states seeing the 9:30 am  time on the chart but knowing she has to come in a little earlier to prepare. Please return call [phone] for clarification.

## 2023-08-18 ENCOUNTER — OUTSIDE PLACE OF SERVICE (OUTPATIENT)
Dept: SURGERY | Facility: CLINIC | Age: 58
End: 2023-08-18

## 2023-08-18 LAB — CRC RECOMMENDATION EXT: NORMAL

## 2023-08-18 PROCEDURE — 45378 PR COLONOSCOPY,DIAGNOSTIC: ICD-10-PCS | Mod: ,,, | Performed by: SURGERY

## 2023-08-18 PROCEDURE — 45378 DIAGNOSTIC COLONOSCOPY: CPT | Mod: ,,, | Performed by: SURGERY

## 2023-08-31 ENCOUNTER — PATIENT OUTREACH (OUTPATIENT)
Dept: ADMINISTRATIVE | Facility: HOSPITAL | Age: 58
End: 2023-08-31
Payer: MEDICAID

## 2023-09-02 DIAGNOSIS — F17.200 SMOKER: ICD-10-CM

## 2023-09-05 RX ORDER — BENZONATATE 100 MG/1
CAPSULE ORAL
Qty: 30 CAPSULE | Refills: 0 | Status: SHIPPED | OUTPATIENT
Start: 2023-09-05 | End: 2023-09-06

## 2023-09-06 ENCOUNTER — OFFICE VISIT (OUTPATIENT)
Dept: PRIMARY CARE CLINIC | Facility: CLINIC | Age: 58
End: 2023-09-06
Payer: MEDICAID

## 2023-09-06 VITALS
WEIGHT: 274.63 LBS | OXYGEN SATURATION: 99 % | HEIGHT: 71 IN | SYSTOLIC BLOOD PRESSURE: 150 MMHG | BODY MASS INDEX: 38.45 KG/M2 | HEART RATE: 77 BPM | DIASTOLIC BLOOD PRESSURE: 90 MMHG

## 2023-09-06 DIAGNOSIS — D50.9 IRON DEFICIENCY ANEMIA, UNSPECIFIED IRON DEFICIENCY ANEMIA TYPE: ICD-10-CM

## 2023-09-06 DIAGNOSIS — G47.00 INSOMNIA, UNSPECIFIED TYPE: Primary | ICD-10-CM

## 2023-09-06 DIAGNOSIS — N93.9 ABNORMAL VAGINAL BLEEDING: ICD-10-CM

## 2023-09-06 DIAGNOSIS — E66.9 CLASS 2 OBESITY WITHOUT SERIOUS COMORBIDITY WITH BODY MASS INDEX (BMI) OF 38.0 TO 38.9 IN ADULT, UNSPECIFIED OBESITY TYPE: ICD-10-CM

## 2023-09-06 DIAGNOSIS — F17.200 SMOKER: ICD-10-CM

## 2023-09-06 DIAGNOSIS — E78.5 HYPERLIPIDEMIA, UNSPECIFIED HYPERLIPIDEMIA TYPE: ICD-10-CM

## 2023-09-06 PROCEDURE — 1159F MED LIST DOCD IN RCRD: CPT | Mod: CPTII,S$GLB,, | Performed by: INTERNAL MEDICINE

## 2023-09-06 PROCEDURE — 3077F PR MOST RECENT SYSTOLIC BLOOD PRESSURE >= 140 MM HG: ICD-10-PCS | Mod: CPTII,S$GLB,, | Performed by: INTERNAL MEDICINE

## 2023-09-06 PROCEDURE — 1159F PR MEDICATION LIST DOCUMENTED IN MEDICAL RECORD: ICD-10-PCS | Mod: CPTII,S$GLB,, | Performed by: INTERNAL MEDICINE

## 2023-09-06 PROCEDURE — 3080F PR MOST RECENT DIASTOLIC BLOOD PRESSURE >= 90 MM HG: ICD-10-PCS | Mod: CPTII,S$GLB,, | Performed by: INTERNAL MEDICINE

## 2023-09-06 PROCEDURE — 3008F BODY MASS INDEX DOCD: CPT | Mod: CPTII,S$GLB,, | Performed by: INTERNAL MEDICINE

## 2023-09-06 PROCEDURE — 3008F PR BODY MASS INDEX (BMI) DOCUMENTED: ICD-10-PCS | Mod: CPTII,S$GLB,, | Performed by: INTERNAL MEDICINE

## 2023-09-06 PROCEDURE — 99214 PR OFFICE/OUTPT VISIT, EST, LEVL IV, 30-39 MIN: ICD-10-PCS | Mod: S$GLB,,, | Performed by: INTERNAL MEDICINE

## 2023-09-06 PROCEDURE — 99214 OFFICE O/P EST MOD 30 MIN: CPT | Mod: S$GLB,,, | Performed by: INTERNAL MEDICINE

## 2023-09-06 PROCEDURE — 3077F SYST BP >= 140 MM HG: CPT | Mod: CPTII,S$GLB,, | Performed by: INTERNAL MEDICINE

## 2023-09-06 PROCEDURE — 3080F DIAST BP >= 90 MM HG: CPT | Mod: CPTII,S$GLB,, | Performed by: INTERNAL MEDICINE

## 2023-09-06 RX ORDER — DIETHYLPROPION HYDROCHLORIDE 75 MG/1
75 TABLET, EXTENDED RELEASE ORAL EVERY MORNING
Qty: 30 TABLET | Refills: 3 | Status: SHIPPED | OUTPATIENT
Start: 2023-09-06 | End: 2024-02-13 | Stop reason: SDUPTHER

## 2023-09-06 RX ORDER — FERROUS SULFATE 325(65) MG
325 TABLET ORAL DAILY
Qty: 90 TABLET | Refills: 3 | Status: SHIPPED | OUTPATIENT
Start: 2023-09-06 | End: 2023-11-08 | Stop reason: SDUPTHER

## 2023-09-06 RX ORDER — TEMAZEPAM 15 MG/1
15 CAPSULE ORAL NIGHTLY PRN
Qty: 30 CAPSULE | Refills: 0 | Status: SHIPPED | OUTPATIENT
Start: 2023-09-06 | End: 2023-10-06

## 2023-09-06 NOTE — PROGRESS NOTES
Subjective:      Patient ID: Mayra Bellamy is a 58 y.o. female.    Chief Complaint: Follow-up (Would like to discuss getting back on adipex. She states she is doing the treadmill(35 mins), walking( 2 rounds around a track) and exercise.  )    HPI      Past Medical History:   Diagnosis Date    Anemia     Arthritis      Patient here for follow up. She has a h/o leiomyoma and her gynecologist is Dr Cortez.   She has restarted her iron pills   She is no longer having vaginal bleeding   She completed her colonoscopy and will be due in 10 years   She wants something for weight loss, she states adipex made her jittery   She is still smoking  She wants something for sleep as well      Review of Systems   Constitutional:  Negative for chills and fever.   HENT:  Negative for hearing loss.    Eyes:  Negative for blurred vision.   Respiratory:  Negative for cough, shortness of breath and wheezing.    Cardiovascular:  Negative for chest pain, palpitations and leg swelling.   Gastrointestinal:  Negative for abdominal pain, blood in stool, constipation, diarrhea, melena, nausea and vomiting.   Genitourinary:  Negative for dysuria, frequency and urgency.   Musculoskeletal:  Negative for falls.   Skin:  Negative for rash.   Neurological:  Negative for dizziness and headaches.   Endo/Heme/Allergies:  Does not bruise/bleed easily.   Psychiatric/Behavioral:  Negative for depression. The patient has insomnia. The patient is not nervous/anxious.      Objective:     Physical Exam  Vitals reviewed.   Constitutional:       Appearance: Normal appearance.   HENT:      Head: Normocephalic.      Mouth/Throat:      Mouth: Mucous membranes are moist.      Pharynx: Oropharynx is clear.   Eyes:      Extraocular Movements: Extraocular movements intact.      Conjunctiva/sclera: Conjunctivae normal.      Pupils: Pupils are equal, round, and reactive to light.   Cardiovascular:      Rate and Rhythm: Normal rate and regular rhythm.   Pulmonary:       "Effort: Pulmonary effort is normal.      Breath sounds: Normal breath sounds.   Abdominal:      General: Bowel sounds are normal.   Musculoskeletal:      Right lower leg: No edema.      Left lower leg: No edema.   Skin:     General: Skin is warm.      Capillary Refill: Capillary refill takes less than 2 seconds.   Neurological:      General: No focal deficit present.      Mental Status: She is alert and oriented to person, place, and time.   Psychiatric:         Mood and Affect: Mood normal.       BP (!) 150/90 (BP Location: Left arm, Patient Position: Sitting, BP Method: X-Large (Manual))   Pulse 77   Ht 5' 11" (1.803 m)   Wt 124.6 kg (274 lb 9.6 oz)   SpO2 99%   BMI 38.30 kg/m²     Assessment:       ICD-10-CM ICD-9-CM   1. Insomnia, unspecified type  G47.00 780.52   2. Class 2 obesity without serious comorbidity with body mass index (BMI) of 38.0 to 38.9 in adult, unspecified obesity type  E66.9 278.00    Z68.38 V85.38   3. Abnormal vaginal bleeding  N93.9 623.8   4. Hyperlipidemia, unspecified hyperlipidemia type  E78.5 272.4   5. Smoker  F17.200 305.1   6. Iron deficiency anemia, unspecified iron deficiency anemia type  D50.9 280.9       Plan:     Medication List with Changes/Refills   New Medications    DIETHYLPROPION (TENUATE) 75 MG SR TABLET    Take 1 tablet (75 mg total) by mouth every morning.    TEMAZEPAM (RESTORIL) 15 MG CAP    Take 1 capsule (15 mg total) by mouth nightly as needed (insomnia).   Current Medications    ACETAMINOPHEN (TYLENOL) 500 MG TABLET    Take 500 mg by mouth every 4 to 6 hours as needed.    ALBUTEROL (PROVENTIL/VENTOLIN HFA) 90 MCG/ACTUATION INHALER    INHALE 2 PUFFS BY MOUTH EVERY 2 TO 4 HOURS AS NEEDED FOR SHORTNESS OF BREATH OR WHEEZING    IBUPROFEN (ADVIL,MOTRIN) 800 MG TABLET    Take 1 tablet (800 mg total) by mouth 3 (three) times daily.    MONTELUKAST (SINGULAIR) 10 MG TABLET    Take 1 tablet (10 mg total) by mouth every evening.    PANTOPRAZOLE (PROTONIX) 40 MG TABLET    " TAKE 1 TABLET(40 MG) BY MOUTH EVERY DAY    SOD SULF-POT CHLORIDE-MAG SULF (SUTAB) 1.479-0.188- 0.225 GRAM TABLET    Take 12 tablets by mouth once daily. Take according to package instructions with indicated amount of water. No breakfast day before test. May substitute with Suprep, Clenpiq, Plenvu, Moviprep or GoLytely based on Rx plan and patient preference.   Changed and/or Refilled Medications    Modified Medication Previous Medication    FERROUS SULFATE (FEOSOL) 325 MG (65 MG IRON) TAB TABLET ferrous sulfate (FEOSOL) 325 mg (65 mg iron) Tab tablet       Take 1 tablet (325 mg total) by mouth once daily. 1 tablet my mouth twice a day    Take 1 tablet (325 mg total) by mouth once daily. 1 tablet my mouth twice a day   Discontinued Medications    BENZONATATE (TESSALON) 100 MG CAPSULE    TAKE 1 CAPSULE(100 MG) BY MOUTH THREE TIMES DAILY AS NEEDED FOR COUGH        1. Insomnia, unspecified type  -     temazepam (RESTORIL) 15 mg Cap; Take 1 capsule (15 mg total) by mouth nightly as needed (insomnia).  Dispense: 30 capsule; Refill: 0    2. Class 2 obesity without serious comorbidity with body mass index (BMI) of 38.0 to 38.9 in adult, unspecified obesity type  -     diethylpropion (TENUATE) 75 mg SR tablet; Take 1 tablet (75 mg total) by mouth every morning.  Dispense: 30 tablet; Refill: 3    3. Abnormal vaginal bleeding  -     ferrous sulfate (FEOSOL) 325 mg (65 mg iron) Tab tablet; Take 1 tablet (325 mg total) by mouth once daily. 1 tablet my mouth twice a day  Dispense: 90 tablet; Refill: 3    4. Hyperlipidemia, unspecified hyperlipidemia type    5. Smoker    6. Iron deficiency anemia, unspecified iron deficiency anemia type               Assistance with smoking cessation was offered, including:  []  Medications  [x]  Counseling  []  Printed Information on Smoking Cessation  []  Referral to a Smoking Cessation Program    Patient was counseled regarding smoking for 3-10 minutes.      Future Appointments   Date Time  Provider Department Center   12/6/2023  1:20 PM Maryjo Bolton MD [facility] [place] [facility]

## 2023-09-07 ENCOUNTER — TELEPHONE (OUTPATIENT)
Dept: PRIMARY CARE CLINIC | Facility: CLINIC | Age: 58
End: 2023-09-07
Payer: MEDICAID

## 2023-09-07 NOTE — TELEPHONE ENCOUNTER
Patient wanted to let you know that her insurance does not pay for that medication.     ----- Message from Jeronimo Jurado sent at 9/7/2023  2:23 PM CDT -----  Contact: Mayra  Patient is calling to inform nurse that diethylpropion (TENUATE) 75 mg SR tablet isn't covered by insurance and would like something else that would be covered. Please give patient a call at [phone]       [id number] [address] [place] [address] AT [facility] [address]  [address] [place] [address]  Phone: [phone] Fax: [phone]

## 2023-09-20 NOTE — TELEPHONE ENCOUNTER
Pt states she paid $53.00 for her med to help her loose weight--pt informed that is probably her co-pay because the meds are very expensive-- chart state she is taking  Diethylpropion (Tenuate) 75mg SR Tab 1 QD

## 2023-11-02 ENCOUNTER — TELEPHONE (OUTPATIENT)
Dept: PRIMARY CARE CLINIC | Facility: CLINIC | Age: 58
End: 2023-11-02
Payer: MEDICAID

## 2023-11-02 NOTE — TELEPHONE ENCOUNTER
----- Message from Jeimy Alfaro sent at 11/2/2023  9:54 AM CDT -----  Contact: self  Type:  Sooner Apoointment Request    Caller is requesting a sooner appointment.  Caller declined first available appointment listed below.  Caller will not accept being placed on the waitlist and is requesting a message be sent to doctor.  Name of Caller:Mayra Bellamy  When is the first available appointment?11/2023  Symptoms:inner ear infection ER f/u  Would the patient rather a call back or a response via [facility]?   Best Call Back Number:[phone]  Additional Information: pt wants a referral to a ENT for treatment

## 2023-11-03 DIAGNOSIS — H92.09 OTALGIA, UNSPECIFIED LATERALITY: Primary | ICD-10-CM

## 2023-11-06 ENCOUNTER — TELEPHONE (OUTPATIENT)
Dept: PRIMARY CARE CLINIC | Facility: CLINIC | Age: 58
End: 2023-11-06
Payer: MEDICAID

## 2023-11-06 NOTE — TELEPHONE ENCOUNTER
----- Message from Mirlande Sinai sent at 11/6/2023 12:55 PM CST -----  Contact: Patient  Patient called to consult with nurse or staff regarding a missed call from the clinic. She states that she was offered an appointment for 11/8 but wanted to know what time. She states she is available to come in on that day. She also had a question about her referral to an ENT and would like a call back. She can be reached at [phone]. Thanks/

## 2023-11-06 NOTE — TELEPHONE ENCOUNTER
Called and spoke with patient she stated that's she does not have a preference of who takes Medicaid but she will call around and check and give us a call back to let us know...

## 2023-11-08 ENCOUNTER — OFFICE VISIT (OUTPATIENT)
Dept: PRIMARY CARE CLINIC | Facility: CLINIC | Age: 58
End: 2023-11-08
Payer: MEDICAID

## 2023-11-08 VITALS
DIASTOLIC BLOOD PRESSURE: 88 MMHG | HEART RATE: 88 BPM | HEIGHT: 71 IN | OXYGEN SATURATION: 99 % | WEIGHT: 275.5 LBS | BODY MASS INDEX: 38.57 KG/M2 | SYSTOLIC BLOOD PRESSURE: 134 MMHG

## 2023-11-08 DIAGNOSIS — G47.00 INSOMNIA, UNSPECIFIED TYPE: ICD-10-CM

## 2023-11-08 DIAGNOSIS — D50.9 IRON DEFICIENCY ANEMIA, UNSPECIFIED IRON DEFICIENCY ANEMIA TYPE: ICD-10-CM

## 2023-11-08 DIAGNOSIS — F17.200 SMOKER: ICD-10-CM

## 2023-11-08 DIAGNOSIS — E66.9 CLASS 2 OBESITY WITHOUT SERIOUS COMORBIDITY WITH BODY MASS INDEX (BMI) OF 38.0 TO 38.9 IN ADULT, UNSPECIFIED OBESITY TYPE: Primary | ICD-10-CM

## 2023-11-08 DIAGNOSIS — N93.9 ABNORMAL VAGINAL BLEEDING: ICD-10-CM

## 2023-11-08 PROCEDURE — 3079F PR MOST RECENT DIASTOLIC BLOOD PRESSURE 80-89 MM HG: ICD-10-PCS | Mod: CPTII,S$GLB,, | Performed by: INTERNAL MEDICINE

## 2023-11-08 PROCEDURE — 90471 FLU VACCINE (QUAD) GREATER THAN OR EQUAL TO 3YO PRESERVATIVE FREE IM: ICD-10-PCS | Mod: S$GLB,,, | Performed by: INTERNAL MEDICINE

## 2023-11-08 PROCEDURE — 90686 FLU VACCINE (QUAD) GREATER THAN OR EQUAL TO 3YO PRESERVATIVE FREE IM: ICD-10-PCS | Mod: S$GLB,,, | Performed by: INTERNAL MEDICINE

## 2023-11-08 PROCEDURE — 3008F BODY MASS INDEX DOCD: CPT | Mod: CPTII,S$GLB,, | Performed by: INTERNAL MEDICINE

## 2023-11-08 PROCEDURE — 99214 OFFICE O/P EST MOD 30 MIN: CPT | Mod: 25,S$GLB,, | Performed by: INTERNAL MEDICINE

## 2023-11-08 PROCEDURE — 3075F SYST BP GE 130 - 139MM HG: CPT | Mod: CPTII,S$GLB,, | Performed by: INTERNAL MEDICINE

## 2023-11-08 PROCEDURE — 3008F PR BODY MASS INDEX (BMI) DOCUMENTED: ICD-10-PCS | Mod: CPTII,S$GLB,, | Performed by: INTERNAL MEDICINE

## 2023-11-08 PROCEDURE — 90471 IMMUNIZATION ADMIN: CPT | Mod: S$GLB,,, | Performed by: INTERNAL MEDICINE

## 2023-11-08 PROCEDURE — 3079F DIAST BP 80-89 MM HG: CPT | Mod: CPTII,S$GLB,, | Performed by: INTERNAL MEDICINE

## 2023-11-08 PROCEDURE — 1159F PR MEDICATION LIST DOCUMENTED IN MEDICAL RECORD: ICD-10-PCS | Mod: CPTII,S$GLB,, | Performed by: INTERNAL MEDICINE

## 2023-11-08 PROCEDURE — 1159F MED LIST DOCD IN RCRD: CPT | Mod: CPTII,S$GLB,, | Performed by: INTERNAL MEDICINE

## 2023-11-08 PROCEDURE — 3075F PR MOST RECENT SYSTOLIC BLOOD PRESS GE 130-139MM HG: ICD-10-PCS | Mod: CPTII,S$GLB,, | Performed by: INTERNAL MEDICINE

## 2023-11-08 PROCEDURE — 99214 PR OFFICE/OUTPT VISIT, EST, LEVL IV, 30-39 MIN: ICD-10-PCS | Mod: 25,S$GLB,, | Performed by: INTERNAL MEDICINE

## 2023-11-08 PROCEDURE — 90686 IIV4 VACC NO PRSV 0.5 ML IM: CPT | Mod: S$GLB,,, | Performed by: INTERNAL MEDICINE

## 2023-11-08 RX ORDER — MONTELUKAST SODIUM 10 MG/1
10 TABLET ORAL NIGHTLY
Qty: 90 TABLET | Refills: 3 | Status: SHIPPED | OUTPATIENT
Start: 2023-11-08

## 2023-11-08 RX ORDER — OFLOXACIN 3 MG/ML
SOLUTION AURICULAR (OTIC)
COMMUNITY
Start: 2023-10-31

## 2023-11-08 RX ORDER — FLUTICASONE PROPIONATE 50 MCG
SPRAY, SUSPENSION (ML) NASAL
COMMUNITY
Start: 2023-11-05

## 2023-11-08 RX ORDER — AMOXICILLIN AND CLAVULANATE POTASSIUM 500; 125 MG/1; MG/1
1 TABLET, FILM COATED ORAL 2 TIMES DAILY
COMMUNITY
Start: 2023-11-05

## 2023-11-08 RX ORDER — FERROUS SULFATE 325(65) MG
325 TABLET ORAL DAILY
Qty: 180 TABLET | Refills: 0 | Status: SHIPPED | OUTPATIENT
Start: 2023-11-08 | End: 2024-02-13 | Stop reason: SDUPTHER

## 2023-11-08 RX ORDER — DEXTROMETHORPHAN HBR, PHENYLEPHRINE HCL, PYRILAMINE MALEATE 7.5; 5; 12.5 MG/5ML; MG/5ML; MG/5ML
SYRUP ORAL
COMMUNITY
Start: 2023-11-06

## 2023-11-08 RX ORDER — ZOLPIDEM TARTRATE 5 MG/1
5 TABLET ORAL NIGHTLY PRN
Qty: 30 TABLET | Refills: 0 | Status: SHIPPED | OUTPATIENT
Start: 2023-11-08 | End: 2023-12-08

## 2023-11-08 NOTE — PROGRESS NOTES
Subjective:      Patient ID: Mayra Bellamy is a 58 y.o. female.    Chief Complaint: Otalgia (Patient is having severe right ear pain , patient went to [facility] ER on 10- for ear pain and given ear drops. And a pain shot that didn't help her , so then she went to  on 11-5-2023 was given cough meds, Abx injection and was told her ear drum was infected . )    HPI      Patient here for follow up. She has a h/o leiomyoma and her gynecologist is Dr Cortez.   She has restarted her iron pills, needs refills today  She is no longer having vaginal bleeding   She completed her colonoscopy and will be due in 10 years   She wants something for weight loss, she states adipex made her jittery, I sent her tenuate which wasn't covered, she doesn't want surgery  She is still smoking  She wants something for sleep as well. She was sent temazepam but she states it made her feel strange. Benadryl and melatonin give her a headache    Problems as above  She completed, abx, tried ofloxacin ear drops but that made her dizzy, completed a course of steroids was taking a lot of BC powder for headaches which she has stopped      Review of Systems   Constitutional:  Negative for chills and fever.   HENT:  Positive for ear pain. Negative for ear discharge and hearing loss.    Eyes:  Negative for blurred vision.   Respiratory:  Negative for cough, shortness of breath and wheezing.    Cardiovascular:  Negative for chest pain, palpitations and leg swelling.   Gastrointestinal:  Negative for abdominal pain, blood in stool, constipation, diarrhea, melena, nausea and vomiting.   Genitourinary:  Negative for dysuria, frequency and urgency.   Musculoskeletal:  Negative for falls.   Skin:  Negative for rash.   Neurological:  Positive for headaches. Negative for dizziness.   Endo/Heme/Allergies:  Does not bruise/bleed easily.   Psychiatric/Behavioral:  Negative for depression. The patient is not nervous/anxious.      Objective:     Physical  "Exam  Vitals reviewed.   Constitutional:       Appearance: Normal appearance.   HENT:      Head: Normocephalic and atraumatic.      Right Ear: Tympanic membrane normal.      Left Ear: Tympanic membrane normal.      Nose: Nose normal.      Mouth/Throat:      Mouth: Mucous membranes are moist.      Pharynx: Oropharynx is clear. No posterior oropharyngeal erythema.   Eyes:      Extraocular Movements: Extraocular movements intact.      Conjunctiva/sclera: Conjunctivae normal.      Pupils: Pupils are equal, round, and reactive to light.   Cardiovascular:      Rate and Rhythm: Normal rate and regular rhythm.   Pulmonary:      Effort: Pulmonary effort is normal.      Breath sounds: Normal breath sounds.   Abdominal:      General: Bowel sounds are normal.   Musculoskeletal:      Right lower leg: No edema.      Left lower leg: No edema.   Skin:     General: Skin is warm.      Capillary Refill: Capillary refill takes less than 2 seconds.   Neurological:      Mental Status: She is alert and oriented to person, place, and time.   Psychiatric:         Mood and Affect: Mood normal.       /88 (BP Location: Left arm, Patient Position: Sitting)   Pulse 88   Ht 5' 11" (1.803 m)   Wt 125 kg (275 lb 8 oz)   SpO2 99%   BMI 38.42 kg/m²     Assessment:       ICD-10-CM ICD-9-CM   1. Class 2 obesity without serious comorbidity with body mass index (BMI) of 38.0 to 38.9 in adult, unspecified obesity type  E66.9 278.00    Z68.38 V85.38   2. Smoker  F17.200 305.1   3. Iron deficiency anemia, unspecified iron deficiency anemia type  D50.9 280.9   4. Insomnia, unspecified type  G47.00 780.52   5. Abnormal vaginal bleeding  N93.9 623.8       Plan:     Medication List with Changes/Refills   New Medications    ZOLPIDEM (AMBIEN) 5 MG TAB    Take 1 tablet (5 mg total) by mouth nightly as needed (insomnia).   Current Medications    ACETAMINOPHEN (TYLENOL) 500 MG TABLET    Take 500 mg by mouth every 4 to 6 hours as needed.    ALBUTEROL " (PROVENTIL/VENTOLIN HFA) 90 MCG/ACTUATION INHALER    INHALE 2 PUFFS BY MOUTH EVERY 2 TO 4 HOURS AS NEEDED FOR SHORTNESS OF BREATH OR WHEEZING    AMOXICILLIN-CLAVULANATE 500-125MG (AUGMENTIN) 500-125 MG TAB    Take 1 tablet by mouth 2 (two) times daily.    DIETHYLPROPION (TENUATE) 75 MG SR TABLET    Take 1 tablet (75 mg total) by mouth every morning.    FLUTICASONE PROPIONATE (FLONASE) 50 MCG/ACTUATION NASAL SPRAY    BOW HEAD AND USE 2 SPRAYS IN EACH NOSTRIL DAILY TO HELP WITH CONGESTION AND DRAINAGE OF SINUSES    IBUPROFEN (ADVIL,MOTRIN) 800 MG TABLET    Take 1 tablet (800 mg total) by mouth 3 (three) times daily.    OFLOXACIN (FLOXIN) 0.3 % OTIC SOLUTION    PLACE 10 DROPS INTO THE RIGHT EAR DAILY FOR 7 DAYS    PANTOPRAZOLE (PROTONIX) 40 MG TABLET    TAKE 1 TABLET(40 MG) BY MOUTH EVERY DAY    POLYTUSSIN DM,PYRILAMINE, 12.5-5-7.5 MG/5 ML LIQD    TAKE 5 ML BY MOUTH EVERY 4 TO 6 HOURS FOR COUGH    SOD SULF-POT CHLORIDE-MAG SULF (SUTAB) 1.479-0.188- 0.225 GRAM TABLET    Take 12 tablets by mouth once daily. Take according to package instructions with indicated amount of water. No breakfast day before test. May substitute with Suprep, Clenpiq, Plenvu, Moviprep or GoLytely based on Rx plan and patient preference.   Changed and/or Refilled Medications    Modified Medication Previous Medication    FERROUS SULFATE (FEOSOL) 325 MG (65 MG IRON) TAB TABLET ferrous sulfate (FEOSOL) 325 mg (65 mg iron) Tab tablet       Take 1 tablet (325 mg total) by mouth once daily. 1 tablet my mouth twice a day    Take 1 tablet (325 mg total) by mouth once daily. 1 tablet my mouth twice a day    MONTELUKAST (SINGULAIR) 10 MG TABLET montelukast (SINGULAIR) 10 mg tablet       Take 1 tablet (10 mg total) by mouth every evening.    Take 1 tablet (10 mg total) by mouth every evening.        1. Class 2 obesity without serious comorbidity with body mass index (BMI) of 38.0 to 38.9 in adult, unspecified obesity type  -     Ambulatory referral/consult  to Nutrition Services; Future; Expected date: 11/15/2023    2. Smoker  -     montelukast (SINGULAIR) 10 mg tablet; Take 1 tablet (10 mg total) by mouth every evening.  Dispense: 90 tablet; Refill: 3    3. Iron deficiency anemia, unspecified iron deficiency anemia type    4. Insomnia, unspecified type  -     zolpidem (AMBIEN) 5 MG Tab; Take 1 tablet (5 mg total) by mouth nightly as needed (insomnia).  Dispense: 30 tablet; Refill: 0    5. Abnormal vaginal bleeding  -     ferrous sulfate (FEOSOL) 325 mg (65 mg iron) Tab tablet; Take 1 tablet (325 mg total) by mouth once daily. 1 tablet my mouth twice a day  Dispense: 180 tablet; Refill: 0       Work excuse provided       Future Appointments   Date Time Provider Department Center   12/6/2023  1:20 PM Maryjo Bolton MD [place] [facility]

## 2023-11-08 NOTE — LETTER
November 8, 2023    Mayra Bellamy  [address] [address]             [facility]  [address] [facility] [address]  [facility] [address]  Phone: [phone]  Fax: [phone] To whom it may concern    Mayra Bellamy was seen in my clinic on 11/8/2023  She may return to work on 11/10/2023 without restrictions    If you have any questions or concerns, please don't hesitate to call.    Sincerely,        Maryjo Bolton MD

## 2023-12-06 ENCOUNTER — OFFICE VISIT (OUTPATIENT)
Dept: PRIMARY CARE CLINIC | Facility: CLINIC | Age: 58
End: 2023-12-06
Payer: MEDICAID

## 2023-12-06 VITALS
OXYGEN SATURATION: 99 % | BODY MASS INDEX: 39.62 KG/M2 | SYSTOLIC BLOOD PRESSURE: 134 MMHG | WEIGHT: 283 LBS | HEART RATE: 68 BPM | HEIGHT: 71 IN | DIASTOLIC BLOOD PRESSURE: 87 MMHG

## 2023-12-06 DIAGNOSIS — D50.9 IRON DEFICIENCY ANEMIA, UNSPECIFIED IRON DEFICIENCY ANEMIA TYPE: ICD-10-CM

## 2023-12-06 DIAGNOSIS — E78.5 HYPERLIPIDEMIA, UNSPECIFIED HYPERLIPIDEMIA TYPE: ICD-10-CM

## 2023-12-06 DIAGNOSIS — F17.200 SMOKER: ICD-10-CM

## 2023-12-06 DIAGNOSIS — E66.9 CLASS 2 OBESITY WITHOUT SERIOUS COMORBIDITY WITH BODY MASS INDEX (BMI) OF 38.0 TO 38.9 IN ADULT, UNSPECIFIED OBESITY TYPE: Primary | ICD-10-CM

## 2023-12-06 DIAGNOSIS — G47.00 INSOMNIA, UNSPECIFIED TYPE: ICD-10-CM

## 2023-12-06 PROCEDURE — 3079F DIAST BP 80-89 MM HG: CPT | Mod: CPTII,S$GLB,, | Performed by: INTERNAL MEDICINE

## 2023-12-06 PROCEDURE — 3008F BODY MASS INDEX DOCD: CPT | Mod: CPTII,S$GLB,, | Performed by: INTERNAL MEDICINE

## 2023-12-06 PROCEDURE — 3075F PR MOST RECENT SYSTOLIC BLOOD PRESS GE 130-139MM HG: ICD-10-PCS | Mod: CPTII,S$GLB,, | Performed by: INTERNAL MEDICINE

## 2023-12-06 PROCEDURE — 3008F PR BODY MASS INDEX (BMI) DOCUMENTED: ICD-10-PCS | Mod: CPTII,S$GLB,, | Performed by: INTERNAL MEDICINE

## 2023-12-06 PROCEDURE — 99213 PR OFFICE/OUTPT VISIT, EST, LEVL III, 20-29 MIN: ICD-10-PCS | Mod: S$GLB,,, | Performed by: INTERNAL MEDICINE

## 2023-12-06 PROCEDURE — 3075F SYST BP GE 130 - 139MM HG: CPT | Mod: CPTII,S$GLB,, | Performed by: INTERNAL MEDICINE

## 2023-12-06 PROCEDURE — 99213 OFFICE O/P EST LOW 20 MIN: CPT | Mod: S$GLB,,, | Performed by: INTERNAL MEDICINE

## 2023-12-06 PROCEDURE — 3079F PR MOST RECENT DIASTOLIC BLOOD PRESSURE 80-89 MM HG: ICD-10-PCS | Mod: CPTII,S$GLB,, | Performed by: INTERNAL MEDICINE

## 2023-12-06 NOTE — PROGRESS NOTES
Subjective:      Patient ID: Mayra Bellamy is a 58 y.o. female.    Chief Complaint: Follow-up (The patient did see dietician on yesterday. )    HPI    Past Medical History:   Diagnosis Date    Anemia     Arthritis          Patient here for follow up. She has a h/o leiomyoma and her gynecologist is Dr Cortez.   She has restarted her iron pills   She is no longer having vaginal bleeding   She completed her colonoscopy and will be due in 10 years   She wants something for weight loss, she states adipex made her jittery   She is still smoking  She wants something for sleep as well  Orthopedic in urgent care, she was told she needs a total knee replacement, was given a cortisone shot and knee brace   Feels better than her last visit     Review of Systems   Constitutional:  Negative for chills and fever.   HENT:  Negative for hearing loss.    Eyes:  Negative for blurred vision.   Respiratory:  Negative for cough, shortness of breath and wheezing.    Cardiovascular:  Negative for chest pain, palpitations and leg swelling.   Gastrointestinal:  Negative for abdominal pain, blood in stool, constipation, diarrhea, melena, nausea and vomiting.   Genitourinary:  Negative for dysuria, frequency and urgency.   Musculoskeletal:  Positive for joint pain. Negative for falls.   Skin:  Negative for rash.   Neurological:  Negative for dizziness and headaches.   Endo/Heme/Allergies:  Does not bruise/bleed easily.   Psychiatric/Behavioral:  Negative for depression. The patient is not nervous/anxious.      Objective:     Physical Exam  Vitals reviewed.   Constitutional:       Appearance: Normal appearance.   HENT:      Head: Normocephalic.      Mouth/Throat:      Mouth: Mucous membranes are moist.      Pharynx: Oropharynx is clear.   Eyes:      Extraocular Movements: Extraocular movements intact.      Conjunctiva/sclera: Conjunctivae normal.      Pupils: Pupils are equal, round, and reactive to light.   Cardiovascular:      Rate and  "Rhythm: Normal rate and regular rhythm.   Pulmonary:      Effort: Pulmonary effort is normal.      Breath sounds: Normal breath sounds.   Abdominal:      General: Bowel sounds are normal.   Musculoskeletal:      Right lower leg: No edema.      Left lower leg: No edema.   Skin:     General: Skin is warm.      Capillary Refill: Capillary refill takes less than 2 seconds.   Neurological:      Mental Status: She is alert and oriented to person, place, and time.   Psychiatric:         Mood and Affect: Mood normal.       /87 (BP Location: Left arm, Patient Position: Sitting, BP Method: X-Large (Automatic))   Pulse 68   Ht 5' 11" (1.803 m)   Wt 128.4 kg (283 lb)   SpO2 99%   BMI 39.47 kg/m²     Assessment:       ICD-10-CM ICD-9-CM   1. Class 2 obesity without serious comorbidity with body mass index (BMI) of 38.0 to 38.9 in adult, unspecified obesity type  E66.9 278.00    Z68.38 V85.38   2. Smoker  F17.200 305.1   3. Iron deficiency anemia, unspecified iron deficiency anemia type  D50.9 280.9   4. Hyperlipidemia, unspecified hyperlipidemia type  E78.5 272.4   5. Insomnia, unspecified type  G47.00 780.52       Plan:     Medication List with Changes/Refills   Current Medications    ACETAMINOPHEN (TYLENOL) 500 MG TABLET    Take 500 mg by mouth every 4 to 6 hours as needed.    ALBUTEROL (PROVENTIL/VENTOLIN HFA) 90 MCG/ACTUATION INHALER    INHALE 2 PUFFS BY MOUTH EVERY 2 TO 4 HOURS AS NEEDED FOR SHORTNESS OF BREATH OR WHEEZING    AMOXICILLIN-CLAVULANATE 500-125MG (AUGMENTIN) 500-125 MG TAB    Take 1 tablet by mouth 2 (two) times daily.    DIETHYLPROPION (TENUATE) 75 MG SR TABLET    Take 1 tablet (75 mg total) by mouth every morning.    FERROUS SULFATE (FEOSOL) 325 MG (65 MG IRON) TAB TABLET    Take 1 tablet (325 mg total) by mouth once daily. 1 tablet my mouth twice a day    FLUTICASONE PROPIONATE (FLONASE) 50 MCG/ACTUATION NASAL SPRAY    BOW HEAD AND USE 2 SPRAYS IN EACH NOSTRIL DAILY TO HELP WITH CONGESTION AND " DRAINAGE OF SINUSES    IBUPROFEN (ADVIL,MOTRIN) 800 MG TABLET    Take 1 tablet (800 mg total) by mouth 3 (three) times daily.    MONTELUKAST (SINGULAIR) 10 MG TABLET    Take 1 tablet (10 mg total) by mouth every evening.    OFLOXACIN (FLOXIN) 0.3 % OTIC SOLUTION    PLACE 10 DROPS INTO THE RIGHT EAR DAILY FOR 7 DAYS    PANTOPRAZOLE (PROTONIX) 40 MG TABLET    TAKE 1 TABLET(40 MG) BY MOUTH EVERY DAY    POLYTUSSIN DM,PYRILAMINE, 12.5-5-7.5 MG/5 ML LIQD    TAKE 5 ML BY MOUTH EVERY 4 TO 6 HOURS FOR COUGH    SOD SULF-POT CHLORIDE-MAG SULF (SUTAB) 1.479-0.188- 0.225 GRAM TABLET    Take 12 tablets by mouth once daily. Take according to package instructions with indicated amount of water. No breakfast day before test. May substitute with Suprep, Clenpiq, Plenvu, Moviprep or GoLytely based on Rx plan and patient preference.    ZOLPIDEM (AMBIEN) 5 MG TAB    Take 1 tablet (5 mg total) by mouth nightly as needed (insomnia).        1. Class 2 obesity without serious comorbidity with body mass index (BMI) of 38.0 to 38.9 in adult, unspecified obesity type    2. Smoker    3. Iron deficiency anemia, unspecified iron deficiency anemia type    4. Hyperlipidemia, unspecified hyperlipidemia type    5. Insomnia, unspecified type         Continue current medications, recommend diet and exercise  Tenuate was sent but she states it cost too much  Shortage of wegovy  Not much will be covered by her insurance but she can consider bariatric surgery   Ambien was prescribed at the last visit for insomnia which she states is helping       Future Appointments   Date Time Provider Department Center   6/7/2024  1:20 PM Maryjo Bolton MD [facility]

## 2023-12-21 ENCOUNTER — TELEPHONE (OUTPATIENT)
Dept: PRIMARY CARE CLINIC | Facility: CLINIC | Age: 58
End: 2023-12-21
Payer: MEDICAID

## 2023-12-22 DIAGNOSIS — M17.9 OSTEOARTHRITIS OF KNEE, UNSPECIFIED LATERALITY, UNSPECIFIED OSTEOARTHRITIS TYPE: Primary | ICD-10-CM

## 2024-02-13 DIAGNOSIS — N93.9 ABNORMAL VAGINAL BLEEDING: ICD-10-CM

## 2024-02-13 DIAGNOSIS — E66.9 CLASS 2 OBESITY WITHOUT SERIOUS COMORBIDITY WITH BODY MASS INDEX (BMI) OF 38.0 TO 38.9 IN ADULT, UNSPECIFIED OBESITY TYPE: ICD-10-CM

## 2024-02-13 NOTE — TELEPHONE ENCOUNTER
----- Message from Mitali Kahn sent at 2/13/2024  1:17 PM CST -----  Contact: patient  Patient calling to advise that she got a call from pharmacy that provider denied her refill for ferrous sulfate (FEOSOL) 325 mg (65 mg iron) Tab tablet. Patient request a call back at [phone]

## 2024-02-16 DIAGNOSIS — N93.9 ABNORMAL VAGINAL BLEEDING: ICD-10-CM

## 2024-02-16 RX ORDER — FERROUS SULFATE 325(65) MG
325 TABLET ORAL 2 TIMES DAILY
Qty: 180 TABLET | Refills: 0 | Status: SHIPPED | OUTPATIENT
Start: 2024-02-16

## 2024-02-16 RX ORDER — FERROUS SULFATE 325(65) MG
325 TABLET ORAL DAILY
Qty: 180 TABLET | Refills: 0 | Status: SHIPPED | OUTPATIENT
Start: 2024-02-16

## 2024-02-16 RX ORDER — DIETHYLPROPION HYDROCHLORIDE 75 MG/1
75 TABLET, EXTENDED RELEASE ORAL EVERY MORNING
Qty: 30 TABLET | Refills: 3 | Status: SHIPPED | OUTPATIENT
Start: 2024-02-16

## 2024-02-16 NOTE — TELEPHONE ENCOUNTER
----- Message from Deisi Denney sent at 2/16/2024 11:42 AM CST -----  Contact: Pt  Type:  RX Refill Request    Who Called:  pt   Refill or New Rx: refill   RX Name and Strength  ferrous sulfate (FEOSOL) 325 mg (65 mg iron) Tab tablet  How is the patient currently taking it? (ex. 1XDay):2 pills once daily   Is this a 30 day or 90 day RX:90 days   Preferred Pharmacy with phone number: [facility]  Local or Mail Order: local   Ordering Provider: sabina   Would the patient rather a call back or a response via [facility]? phone  Best Call Back Number: [phone]  Additional Information:        [facility] #[id number] - [address]  [address] [place] [id number]  Phone: [phone] Fax: [phone]

## 2024-04-09 ENCOUNTER — TELEPHONE (OUTPATIENT)
Dept: PRIMARY CARE CLINIC | Facility: CLINIC | Age: 59
End: 2024-04-09
Payer: MEDICAID
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
## 2024-05-21 DIAGNOSIS — G47.00 INSOMNIA, UNSPECIFIED TYPE: ICD-10-CM

## 2024-05-21 RX ORDER — ZOLPIDEM TARTRATE 5 MG/1
5 TABLET ORAL NIGHTLY PRN
Qty: 30 TABLET | Refills: 0 | Status: SHIPPED | OUTPATIENT
Start: 2024-05-21

## 2024-07-15 ENCOUNTER — OFFICE VISIT (OUTPATIENT)
Dept: PRIMARY CARE CLINIC | Facility: CLINIC | Age: 59
End: 2024-07-15
Payer: MEDICAID

## 2024-07-15 VITALS
HEART RATE: 86 BPM | SYSTOLIC BLOOD PRESSURE: 148 MMHG | OXYGEN SATURATION: 96 % | BODY MASS INDEX: 39.04 KG/M2 | DIASTOLIC BLOOD PRESSURE: 100 MMHG | WEIGHT: 278.88 LBS | HEIGHT: 71 IN

## 2024-07-15 DIAGNOSIS — I10 HYPERTENSION, UNSPECIFIED TYPE: ICD-10-CM

## 2024-07-15 DIAGNOSIS — Z11.59 NEED FOR HEPATITIS C SCREENING TEST: Primary | ICD-10-CM

## 2024-07-15 DIAGNOSIS — N93.9 ABNORMAL VAGINAL BLEEDING: ICD-10-CM

## 2024-07-15 DIAGNOSIS — Z12.39 ENCOUNTER FOR SCREENING FOR MALIGNANT NEOPLASM OF BREAST, UNSPECIFIED SCREENING MODALITY: ICD-10-CM

## 2024-07-15 DIAGNOSIS — E78.5 HYPERLIPIDEMIA, UNSPECIFIED HYPERLIPIDEMIA TYPE: ICD-10-CM

## 2024-07-15 DIAGNOSIS — F17.200 SMOKER: ICD-10-CM

## 2024-07-15 PROCEDURE — 3080F DIAST BP >= 90 MM HG: CPT | Mod: CPTII,S$GLB,, | Performed by: INTERNAL MEDICINE

## 2024-07-15 PROCEDURE — 99214 OFFICE O/P EST MOD 30 MIN: CPT | Mod: S$GLB,,, | Performed by: INTERNAL MEDICINE

## 2024-07-15 PROCEDURE — 3077F SYST BP >= 140 MM HG: CPT | Mod: CPTII,S$GLB,, | Performed by: INTERNAL MEDICINE

## 2024-07-15 PROCEDURE — 3008F BODY MASS INDEX DOCD: CPT | Mod: CPTII,S$GLB,, | Performed by: INTERNAL MEDICINE

## 2024-07-15 PROCEDURE — 1159F MED LIST DOCD IN RCRD: CPT | Mod: CPTII,S$GLB,, | Performed by: INTERNAL MEDICINE

## 2024-07-15 RX ORDER — FERROUS SULFATE 325(65) MG
325 TABLET ORAL 2 TIMES DAILY
Qty: 180 TABLET | Refills: 0 | Status: SHIPPED | OUTPATIENT
Start: 2024-07-15

## 2024-07-15 RX ORDER — MONTELUKAST SODIUM 10 MG/1
10 TABLET ORAL NIGHTLY
Qty: 90 TABLET | Refills: 3 | Status: SHIPPED | OUTPATIENT
Start: 2024-07-15
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
## 2024-07-16 ENCOUNTER — CLINICAL SUPPORT (OUTPATIENT)
Dept: OBSTETRICS AND GYNECOLOGY | Facility: CLINIC | Age: 59
End: 2024-07-16
Payer: MEDICAID

## 2024-07-16 DIAGNOSIS — Z01.89 ROUTINE LAB DRAW: Primary | ICD-10-CM

## 2024-07-16 LAB
% SATURATION: 20 % (ref 20–50)
ABS NRBC COUNT: 0 THOU/UL (ref 0–0.01)
ABSOLUTE BASOPHIL: 0 10*3/UL (ref 0–0.3)
ABSOLUTE EOSINOPHIL: 0.2 10*3/UL (ref 0–0.6)
ABSOLUTE IMMATURE GRAN: 0.02 THOU/UL (ref 0–0.03)
ABSOLUTE LYMPHOCYTE: 2 10*3/UL (ref 1.2–4)
ABSOLUTE MONOCYTE: 0.7 10*3/UL (ref 0.1–0.8)
ANION GAP SERPL CALC-SCNC: 6 MMOL/L (ref 3–11)
BASOPHILS NFR BLD: 0.2 % (ref 0–3)
BUN SERPL-MCNC: 10 MG/DL (ref 7–18)
BUN/CREAT SERPL: 13.33 RATIO
CALCIUM SERPL-MCNC: 9.5 MG/DL (ref 8.5–10.1)
CHLORIDE SERPL-SCNC: 108 MMOL/L (ref 98–107)
CHOLEST SERPL-MSCNC: 240 MG/DL
CO2 SERPL-SCNC: 27 MMOL/L (ref 21–32)
CREAT SERPL-MCNC: 0.75 MG/DL (ref 0.55–1.02)
EOSINOPHIL NFR BLD: 3.9 % (ref 0–6)
ERYTHROCYTE [DISTWIDTH] IN BLOOD BY AUTOMATED COUNT: 15.4 % (ref 0–15.5)
FERRITIN SERPL-MCNC: 273.6 NG/ML (ref 8–252)
GFR ESTIMATION: > 60
GLUCOSE SERPL-MCNC: 96 MG/DL (ref 74–106)
HCT VFR BLD AUTO: 41.4 % (ref 37–47)
HCV AB SERPL QL IA: NONREACTIVE
HDLC SERPL-MCNC: 65 MG/DL
HGB BLD-MCNC: 13.4 G/DL (ref 12–16)
IMMATURE GRANULOCYTES: 0.3 % (ref 0–0.43)
IRON: 71 UG/DL (ref 50–170)
LDLC SERPL CALC-MCNC: 155.4 MG/DL
LYMPHOCYTES NFR BLD: 32.8 % (ref 20–45)
MCH RBC QN AUTO: 30.2 PG (ref 27–32)
MCHC RBC AUTO-ENTMCNC: 32.4 % (ref 32–36)
MCV RBC AUTO: 93.2 FL (ref 80–99)
MONOCYTES NFR BLD: 10.7 % (ref 2–10)
NEUTROPHILS # BLD AUTO: 3.2 10*3/UL (ref 1.4–7)
NEUTROPHILS NFR BLD: 52.1 % (ref 50–80)
NUCLEATED RED BLOOD CELLS: 0 % (ref 0–0.2)
PLATELETS: 297 10*3/UL (ref 130–400)
PMV BLD AUTO: 10.5 FL (ref 9.2–12.2)
POTASSIUM SERPL-SCNC: 3.8 MMOL/L (ref 3.5–5.1)
RBC # BLD AUTO: 4.44 10*6/UL (ref 4.2–5.4)
SODIUM BLD-SCNC: 141 MMOL/L (ref 131–143)
TOTAL IRON BINDING CAPACITY: 363 UG/DL (ref 250–450)
TRIGL SERPL-MCNC: 98 MG/DL (ref 0–149)
VLDL CHOLESTEROL: 20 MG/DL
WBC # BLD: 6.2 10*3/UL (ref 4.5–10)

## 2024-07-16 PROCEDURE — 36415 COLL VENOUS BLD VENIPUNCTURE: CPT | Mod: S$GLB,,, | Performed by: INTERNAL MEDICINE

## 2024-07-17 ENCOUNTER — PATIENT MESSAGE (OUTPATIENT)
Dept: PRIMARY CARE CLINIC | Facility: CLINIC | Age: 59
End: 2024-07-17
Payer: MEDICAID

## 2024-07-17 ENCOUNTER — TELEPHONE (OUTPATIENT)
Dept: PRIMARY CARE CLINIC | Facility: CLINIC | Age: 59
End: 2024-07-17
Payer: MEDICAID

## 2024-07-17 NOTE — TELEPHONE ENCOUNTER
""My concern is I drink occ and I do smoke.     Healthy eating tips to lower cholesterol  Include legumes (or pulses such as chickpeas, lentils, split peas), beans (such as haricot beans, kidney beans, baked beans , bean mixes) in at least two meals a week. Check food labels and choose the lowest sodium (salt) products.     ----- Message from Sarah Silva sent at 7/17/2024  1:19 PM CDT -----  Patient is have question about results from labs please call her back at [phone] (home)  "

## 2024-07-25 ENCOUNTER — CLINICAL SUPPORT (OUTPATIENT)
Dept: PRIMARY CARE CLINIC | Facility: CLINIC | Age: 59
End: 2024-07-25
Payer: MEDICAID

## 2024-07-25 VITALS — SYSTOLIC BLOOD PRESSURE: 174 MMHG | OXYGEN SATURATION: 99 % | DIASTOLIC BLOOD PRESSURE: 102 MMHG | HEART RATE: 66 BPM

## 2024-07-25 DIAGNOSIS — I10 HYPERTENSION, UNSPECIFIED TYPE: Primary | ICD-10-CM

## 2024-07-25 RX ORDER — AMLODIPINE BESYLATE 5 MG/1
5 TABLET ORAL DAILY
Qty: 90 TABLET | Refills: 3 | Status: SHIPPED | OUTPATIENT
Start: 2024-07-25 | End: 2025-07-25

## 2024-08-09 ENCOUNTER — TELEPHONE (OUTPATIENT)
Dept: PRIMARY CARE CLINIC | Facility: CLINIC | Age: 59
End: 2024-08-09
Payer: MEDICAID

## 2024-08-13 ENCOUNTER — CLINICAL SUPPORT (OUTPATIENT)
Dept: PRIMARY CARE CLINIC | Facility: CLINIC | Age: 59
End: 2024-08-13
Payer: MEDICAID

## 2024-08-13 VITALS — SYSTOLIC BLOOD PRESSURE: 118 MMHG | OXYGEN SATURATION: 98 % | DIASTOLIC BLOOD PRESSURE: 76 MMHG | HEART RATE: 76 BPM

## 2024-08-13 DIAGNOSIS — Z01.30 BLOOD PRESSURE CHECK: Primary | ICD-10-CM

## 2024-08-15 ENCOUNTER — PATIENT MESSAGE (OUTPATIENT)
Dept: PRIMARY CARE CLINIC | Facility: CLINIC | Age: 59
End: 2024-08-15
Payer: MEDICAID

## 2024-09-06 ENCOUNTER — PATIENT MESSAGE (OUTPATIENT)
Dept: PRIMARY CARE CLINIC | Facility: CLINIC | Age: 59
End: 2024-09-06
Payer: MEDICAID

## 2024-10-10 ENCOUNTER — DOCUMENTATION ONLY (OUTPATIENT)
Dept: OBSTETRICS AND GYNECOLOGY | Facility: CLINIC | Age: 59
End: 2024-10-10
Payer: MEDICAID

## 2024-10-17 ENCOUNTER — TELEPHONE (OUTPATIENT)
Dept: PRIMARY CARE CLINIC | Facility: CLINIC | Age: 59
End: 2024-10-17

## 2024-10-17 ENCOUNTER — OFFICE VISIT (OUTPATIENT)
Dept: PRIMARY CARE CLINIC | Facility: CLINIC | Age: 59
End: 2024-10-17
Payer: MEDICAID

## 2024-10-17 VITALS
DIASTOLIC BLOOD PRESSURE: 88 MMHG | HEIGHT: 71 IN | OXYGEN SATURATION: 99 % | HEART RATE: 66 BPM | WEIGHT: 278 LBS | BODY MASS INDEX: 38.92 KG/M2 | SYSTOLIC BLOOD PRESSURE: 126 MMHG

## 2024-10-17 DIAGNOSIS — J06.9 UPPER RESPIRATORY TRACT INFECTION, UNSPECIFIED TYPE: ICD-10-CM

## 2024-10-17 DIAGNOSIS — E66.812 CLASS 2 OBESITY WITHOUT SERIOUS COMORBIDITY WITH BODY MASS INDEX (BMI) OF 38.0 TO 38.9 IN ADULT, UNSPECIFIED OBESITY TYPE: ICD-10-CM

## 2024-10-17 DIAGNOSIS — F17.200 SMOKER: ICD-10-CM

## 2024-10-17 DIAGNOSIS — Z23 FLU VACCINE NEED: Primary | ICD-10-CM

## 2024-10-17 PROCEDURE — 99406 BEHAV CHNG SMOKING 3-10 MIN: CPT | Mod: S$PBB,,, | Performed by: INTERNAL MEDICINE

## 2024-10-17 PROCEDURE — 3008F BODY MASS INDEX DOCD: CPT | Mod: CPTII,,, | Performed by: INTERNAL MEDICINE

## 2024-10-17 PROCEDURE — 99214 OFFICE O/P EST MOD 30 MIN: CPT | Mod: S$PBB,25,, | Performed by: INTERNAL MEDICINE

## 2024-10-17 PROCEDURE — 3079F DIAST BP 80-89 MM HG: CPT | Mod: CPTII,,, | Performed by: INTERNAL MEDICINE

## 2024-10-17 PROCEDURE — 3074F SYST BP LT 130 MM HG: CPT | Mod: CPTII,,, | Performed by: INTERNAL MEDICINE

## 2024-10-17 RX ORDER — BENZONATATE 100 MG/1
100 CAPSULE ORAL 3 TIMES DAILY PRN
Qty: 30 CAPSULE | Refills: 0 | Status: SHIPPED | OUTPATIENT
Start: 2024-10-17 | End: 2024-10-27

## 2024-10-17 RX ORDER — AZITHROMYCIN 250 MG/1
TABLET, FILM COATED ORAL
Qty: 6 TABLET | Refills: 0 | Status: SHIPPED | OUTPATIENT
Start: 2024-10-17 | End: 2024-10-22
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
## 2024-10-17 NOTE — TELEPHONE ENCOUNTER
NO PA COMPLETED FOR HER RUBA LUNSFORD--INS-MEDICAID DOES NOT PAY FOR COUGH MED--PHARMACY STATES THEY ARE GOING TO PUT ON A GOOD RX COUPON

## 2024-10-29 ENCOUNTER — TELEPHONE (OUTPATIENT)
Dept: SMOKING CESSATION | Facility: CLINIC | Age: 59
End: 2024-10-29
Payer: MEDICAID

## 2024-12-14 DIAGNOSIS — N93.9 ABNORMAL VAGINAL BLEEDING: ICD-10-CM

## 2024-12-16 RX ORDER — BENZONATATE 200 MG/1
CAPSULE ORAL
COMMUNITY
Start: 2024-08-08 | End: 2024-12-16 | Stop reason: SDUPTHER

## 2024-12-16 RX ORDER — FERROUS SULFATE 325(65) MG
TABLET ORAL 2 TIMES DAILY
Qty: 180 TABLET | Refills: 0 | Status: SHIPPED | OUTPATIENT
Start: 2024-12-16

## 2024-12-16 RX ORDER — BENZONATATE 200 MG/1
200 CAPSULE ORAL 3 TIMES DAILY PRN
Qty: 30 CAPSULE | Refills: 1 | Status: SHIPPED | OUTPATIENT
Start: 2024-12-16 | End: 2024-12-26

## 2024-12-16 NOTE — TELEPHONE ENCOUNTER
----- Message from Collections sent at 12/16/2024  9:46 AM CST -----  Contact: NASRA VÁZQUEZ [[id number]]  .Type:  Patient Requesting Call    Who Called:NARSA VÁZQUEZ [[id number]]  Does the patient know what this is regarding?:meds refill  1. Benzonatate 100mg - not listed in chart  Would the patient rather a call back or a response via [facility]? call  Best Call Back Number:.[phone] (home)     Additional Information: .  [id number] - [address] & [address] [address]  Phone: [phone] Fax: [phone]

## 2025-01-16 NOTE — PROGRESS NOTES
Dr Bolton was advised by me of the patient's BP number. Dr Bolton did go in and speak with the patient. Unsure of their plan.

## 2025-02-17 ENCOUNTER — TELEPHONE (OUTPATIENT)
Dept: PRIMARY CARE CLINIC | Facility: CLINIC | Age: 60
End: 2025-02-17

## 2025-02-17 NOTE — TELEPHONE ENCOUNTER
benzonatate (TESSALON) 100 MG capsul     ----- Message from Sarah sent at 2/17/2025 10:49 AM CST -----  Patient is requesting a refill on cough medication not listed on chart...

## 2025-03-19 ENCOUNTER — TELEPHONE (OUTPATIENT)
Dept: PRIMARY CARE CLINIC | Facility: CLINIC | Age: 60
End: 2025-03-19
Payer: MEDICAID

## 2025-03-19 DIAGNOSIS — N93.9 ABNORMAL VAGINAL BLEEDING: ICD-10-CM

## 2025-03-19 DIAGNOSIS — J06.9 UPPER RESPIRATORY TRACT INFECTION, UNSPECIFIED TYPE: Primary | ICD-10-CM

## 2025-03-19 RX ORDER — BENZONATATE 200 MG/1
200 CAPSULE ORAL
Qty: 30 CAPSULE | Refills: 1 | Status: SHIPPED | OUTPATIENT
Start: 2025-03-19

## 2025-03-19 RX ORDER — FERROUS SULFATE 325(65) MG
TABLET ORAL 2 TIMES DAILY
Qty: 180 TABLET | Refills: 0 | Status: SHIPPED | OUTPATIENT
Start: 2025-03-19

## 2025-03-19 NOTE — TELEPHONE ENCOUNTER
----- Message from Misti sent at 3/19/2025 12:20 PM CDT -----  Contact: Mayra  Type:  Needs Medical AdviceWho Called: James (please be specific): Sinus InfectionHow long has patient had these symptoms:  Pharmacy name and phone #:  [id number] - [place], [place] - [address]  AT [facility] [id number] [address]Rqxgy: [phone] Fax: [phone] the patient rather a call back or a response via [facility]? Call Back Best Call Back Number: [phone] Information: Patient is calling in regards to she will like to have a Z pack called in and she also states that the pharmacy will be calling in about her cough meds and iron pills.

## 2025-03-19 NOTE — TELEPHONE ENCOUNTER
NO PA COMPLETED--MEDICAID DOES NOT PAY FOR ANY COUGH MED--PT'S PHARMACY HAS PUT ON A COUPON AND HER CO-PAY IS $25.00

## 2025-06-11 ENCOUNTER — RESULTS FOLLOW-UP (OUTPATIENT)
Dept: OBSTETRICS AND GYNECOLOGY | Facility: CLINIC | Age: 60
End: 2025-06-11

## 2025-06-27 DIAGNOSIS — N93.9 ABNORMAL VAGINAL BLEEDING: ICD-10-CM

## 2025-06-27 NOTE — TELEPHONE ENCOUNTER
Copied from CRM #[id number]. Topic: Medications - Medication Refill  >> Jun 27, 2025  2:59 PM Raeann wrote:  Type:  RX Refill Request    Who Called: Mayra Bellamy  Refill or New Rx:refill  RX Name and Strength:FEROSUL 325 mg (65 mg iron) Tab tablet  How is the patient currently taking it? (ex. 1XDay):2xday  Is this a 30 day or 90 day RX:90  Preferred Pharmacy with phone number:  [facility] #[id number] - [place], [place] - [address] [facility] [address]  [address] [facility] [address]  Phone: [phone] Fax: [phone]  Local or Mail Order:local  Ordering Provider:sabina  Would the patient rather a call back or a response via [facility]? Call back  Best Call Back Number:[phone]  Additional Information: pt stating she is out of medication. Needing a refill

## 2025-06-27 NOTE — TELEPHONE ENCOUNTER
Lvm to notify office closed for the weekend.      Copied from CRM #[id number]. Topic: Appointments - Appointment Access  >> Jun 27, 2025 11:56 AM Raeann wrote:  Type:  Sooner Appointment Request    Caller is requesting a sooner appointment.  Caller declined first available appointment listed below.  Caller will not accept being placed on the waitlist and is requesting a message be sent to doctor.  Name of Caller:Mayra Bellamy  When is the first available appointment?no appt generated  Symptoms:checkup, refills  Would the patient rather a call back or a response via [facility]? Call back  Best Call Back Number:[phone]  Additional Information: pt needing to schedule appt

## 2025-06-28 RX ORDER — FERROUS SULFATE 325(65) MG
TABLET ORAL 2 TIMES DAILY
Qty: 180 TABLET | Refills: 0 | Status: SHIPPED | OUTPATIENT
Start: 2025-06-28

## 2025-07-14 RX ORDER — DICLOFENAC SODIUM 75 MG/1
75 TABLET, DELAYED RELEASE ORAL 2 TIMES DAILY PRN
COMMUNITY
Start: 2025-02-04

## 2025-07-17 ENCOUNTER — OFFICE VISIT (OUTPATIENT)
Dept: PRIMARY CARE CLINIC | Facility: CLINIC | Age: 60
End: 2025-07-17
Payer: MEDICAID

## 2025-07-17 ENCOUNTER — TELEPHONE (OUTPATIENT)
Dept: PRIMARY CARE CLINIC | Facility: CLINIC | Age: 60
End: 2025-07-17

## 2025-07-17 VITALS
SYSTOLIC BLOOD PRESSURE: 142 MMHG | WEIGHT: 292.38 LBS | HEIGHT: 71 IN | BODY MASS INDEX: 40.93 KG/M2 | HEART RATE: 69 BPM | OXYGEN SATURATION: 99 % | DIASTOLIC BLOOD PRESSURE: 89 MMHG

## 2025-07-17 DIAGNOSIS — Z12.39 ENCOUNTER FOR SCREENING FOR MALIGNANT NEOPLASM OF BREAST, UNSPECIFIED SCREENING MODALITY: ICD-10-CM

## 2025-07-17 DIAGNOSIS — R05.9 COUGH, UNSPECIFIED TYPE: Primary | ICD-10-CM

## 2025-07-17 DIAGNOSIS — I10 HYPERTENSION, UNSPECIFIED TYPE: ICD-10-CM

## 2025-07-17 DIAGNOSIS — N93.9 ABNORMAL VAGINAL BLEEDING: ICD-10-CM

## 2025-07-17 DIAGNOSIS — F17.200 SMOKER: ICD-10-CM

## 2025-07-17 DIAGNOSIS — J06.9 UPPER RESPIRATORY TRACT INFECTION, UNSPECIFIED TYPE: ICD-10-CM

## 2025-07-17 DIAGNOSIS — E66.813 CLASS 3 SEVERE OBESITY WITH BODY MASS INDEX (BMI) OF 40.0 TO 44.9 IN ADULT, UNSPECIFIED OBESITY TYPE, UNSPECIFIED WHETHER SERIOUS COMORBIDITY PRESENT: ICD-10-CM

## 2025-07-17 PROCEDURE — 3008F BODY MASS INDEX DOCD: CPT | Mod: CPTII,,, | Performed by: INTERNAL MEDICINE

## 2025-07-17 PROCEDURE — 3077F SYST BP >= 140 MM HG: CPT | Mod: CPTII,,, | Performed by: INTERNAL MEDICINE

## 2025-07-17 PROCEDURE — 99214 OFFICE O/P EST MOD 30 MIN: CPT | Mod: S$PBB,,, | Performed by: INTERNAL MEDICINE

## 2025-07-17 PROCEDURE — 3079F DIAST BP 80-89 MM HG: CPT | Mod: CPTII,,, | Performed by: INTERNAL MEDICINE

## 2025-07-17 RX ORDER — FERROUS SULFATE 325(65) MG
325 TABLET ORAL 2 TIMES DAILY
Qty: 180 TABLET | Refills: 0 | Status: SHIPPED | OUTPATIENT
Start: 2025-07-17

## 2025-07-17 RX ORDER — PROMETHAZINE HYDROCHLORIDE AND DEXTROMETHORPHAN HYDROBROMIDE 6.25; 15 MG/5ML; MG/5ML
SYRUP ORAL
COMMUNITY
Start: 2025-07-13

## 2025-07-17 RX ORDER — MONTELUKAST SODIUM 10 MG/1
10 TABLET ORAL NIGHTLY
Qty: 90 TABLET | Refills: 3 | Status: SHIPPED | OUTPATIENT
Start: 2025-07-17

## 2025-07-17 RX ORDER — ALBUTEROL SULFATE 90 UG/1
INHALANT RESPIRATORY (INHALATION)
Qty: 18 G | Refills: 3 | Status: SHIPPED | OUTPATIENT
Start: 2025-07-17

## 2025-07-17 RX ORDER — AZITHROMYCIN 250 MG/1
TABLET, FILM COATED ORAL
COMMUNITY
Start: 2025-07-13

## 2025-07-17 RX ORDER — BENZONATATE 200 MG/1
200 CAPSULE ORAL 3 TIMES DAILY PRN
Qty: 30 CAPSULE | Refills: 1 | Status: SHIPPED | OUTPATIENT
Start: 2025-07-17
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
## 2025-07-17 NOTE — TELEPHONE ENCOUNTER
Pt informed that ins does not pay for cough meds--pt states she has picked up med and paid out of pocket

## 2025-07-20 RX ORDER — AMLODIPINE BESYLATE 5 MG/1
5 TABLET ORAL
Qty: 90 TABLET | Refills: 3 | Status: SHIPPED | OUTPATIENT
Start: 2025-07-20

## 2025-07-21 ENCOUNTER — TELEPHONE (OUTPATIENT)
Dept: PRIMARY CARE CLINIC | Facility: CLINIC | Age: 60
End: 2025-07-21
Payer: MEDICAID

## 2025-08-29 ENCOUNTER — PATIENT MESSAGE (OUTPATIENT)
Dept: ADMINISTRATIVE | Facility: HOSPITAL | Age: 60
End: 2025-08-29
Payer: MEDICAID